# Patient Record
Sex: MALE | Race: BLACK OR AFRICAN AMERICAN | NOT HISPANIC OR LATINO | Employment: OTHER | URBAN - METROPOLITAN AREA
[De-identification: names, ages, dates, MRNs, and addresses within clinical notes are randomized per-mention and may not be internally consistent; named-entity substitution may affect disease eponyms.]

---

## 2023-05-30 ENCOUNTER — TELEPHONE (OUTPATIENT)
Dept: PRIMARY CARE | Facility: CLINIC | Age: 68
End: 2023-05-30
Payer: COMMERCIAL

## 2023-06-01 ENCOUNTER — OFFICE VISIT (OUTPATIENT)
Dept: PRIMARY CARE | Facility: CLINIC | Age: 68
End: 2023-06-01
Payer: COMMERCIAL

## 2023-06-01 VITALS
WEIGHT: 213.7 LBS | SYSTOLIC BLOOD PRESSURE: 120 MMHG | HEART RATE: 76 BPM | DIASTOLIC BLOOD PRESSURE: 71 MMHG | TEMPERATURE: 98.2 F | BODY MASS INDEX: 31.56 KG/M2 | OXYGEN SATURATION: 94 %

## 2023-06-01 DIAGNOSIS — I25.10 CORONARY ARTERY DISEASE INVOLVING NATIVE HEART WITHOUT ANGINA PECTORIS, UNSPECIFIED VESSEL OR LESION TYPE: ICD-10-CM

## 2023-06-01 DIAGNOSIS — E78.5 HYPERLIPIDEMIA, UNSPECIFIED HYPERLIPIDEMIA TYPE: ICD-10-CM

## 2023-06-01 DIAGNOSIS — M1A.9XX0 CHRONIC GOUT WITHOUT TOPHUS, UNSPECIFIED CAUSE, UNSPECIFIED SITE: ICD-10-CM

## 2023-06-01 DIAGNOSIS — E11.9 TYPE 2 DIABETES MELLITUS WITHOUT COMPLICATION, WITHOUT LONG-TERM CURRENT USE OF INSULIN (MULTI): ICD-10-CM

## 2023-06-01 DIAGNOSIS — E55.9 VITAMIN D DEFICIENCY: ICD-10-CM

## 2023-06-01 DIAGNOSIS — I10 HYPERTENSION, UNSPECIFIED TYPE: Primary | ICD-10-CM

## 2023-06-01 PROCEDURE — 1160F RVW MEDS BY RX/DR IN RCRD: CPT | Performed by: STUDENT IN AN ORGANIZED HEALTH CARE EDUCATION/TRAINING PROGRAM

## 2023-06-01 PROCEDURE — 1157F ADVNC CARE PLAN IN RCRD: CPT | Performed by: STUDENT IN AN ORGANIZED HEALTH CARE EDUCATION/TRAINING PROGRAM

## 2023-06-01 PROCEDURE — 1036F TOBACCO NON-USER: CPT | Performed by: STUDENT IN AN ORGANIZED HEALTH CARE EDUCATION/TRAINING PROGRAM

## 2023-06-01 PROCEDURE — 1159F MED LIST DOCD IN RCRD: CPT | Performed by: STUDENT IN AN ORGANIZED HEALTH CARE EDUCATION/TRAINING PROGRAM

## 2023-06-01 PROCEDURE — 4010F ACE/ARB THERAPY RXD/TAKEN: CPT | Performed by: STUDENT IN AN ORGANIZED HEALTH CARE EDUCATION/TRAINING PROGRAM

## 2023-06-01 PROCEDURE — 3078F DIAST BP <80 MM HG: CPT | Performed by: STUDENT IN AN ORGANIZED HEALTH CARE EDUCATION/TRAINING PROGRAM

## 2023-06-01 PROCEDURE — 99214 OFFICE O/P EST MOD 30 MIN: CPT | Performed by: STUDENT IN AN ORGANIZED HEALTH CARE EDUCATION/TRAINING PROGRAM

## 2023-06-01 PROCEDURE — 3074F SYST BP LT 130 MM HG: CPT | Performed by: STUDENT IN AN ORGANIZED HEALTH CARE EDUCATION/TRAINING PROGRAM

## 2023-06-01 RX ORDER — CHOLECALCIFEROL (VITAMIN D3) 25 MCG
25 TABLET ORAL DAILY
COMMUNITY
End: 2023-06-01 | Stop reason: SDUPTHER

## 2023-06-01 RX ORDER — HYDROCHLOROTHIAZIDE 25 MG/1
25 TABLET ORAL DAILY
Qty: 90 TABLET | Refills: 1 | Status: SHIPPED | OUTPATIENT
Start: 2023-06-01 | End: 2023-08-18 | Stop reason: SDUPTHER

## 2023-06-01 RX ORDER — HYDROCHLOROTHIAZIDE 12.5 MG/1
12.5 TABLET ORAL DAILY
COMMUNITY
Start: 2018-06-24 | End: 2023-06-01 | Stop reason: DRUGHIGH

## 2023-06-01 RX ORDER — ATORVASTATIN CALCIUM 40 MG/1
40 TABLET, FILM COATED ORAL DAILY
COMMUNITY
Start: 2022-04-18 | End: 2023-06-01 | Stop reason: SDUPTHER

## 2023-06-01 RX ORDER — LOSARTAN POTASSIUM 25 MG/1
25 TABLET ORAL DAILY
COMMUNITY
Start: 2021-10-25 | End: 2023-06-01 | Stop reason: SDUPTHER

## 2023-06-01 RX ORDER — LOSARTAN POTASSIUM 25 MG/1
25 TABLET ORAL DAILY
Qty: 90 TABLET | Refills: 1 | Status: SHIPPED | OUTPATIENT
Start: 2023-06-01 | End: 2023-08-18 | Stop reason: SDUPTHER

## 2023-06-01 RX ORDER — DULAGLUTIDE 1.5 MG/.5ML
1.5 INJECTION, SOLUTION SUBCUTANEOUS
COMMUNITY
Start: 2022-01-24 | End: 2023-06-01 | Stop reason: SDUPTHER

## 2023-06-01 RX ORDER — AMLODIPINE BESYLATE 10 MG/1
10 TABLET ORAL DAILY
COMMUNITY
Start: 2017-11-01 | End: 2023-06-01 | Stop reason: SDUPTHER

## 2023-06-01 RX ORDER — AMLODIPINE BESYLATE 10 MG/1
10 TABLET ORAL DAILY
Qty: 90 TABLET | Refills: 1 | Status: SHIPPED | OUTPATIENT
Start: 2023-06-01 | End: 2023-08-18 | Stop reason: SDUPTHER

## 2023-06-01 RX ORDER — METOPROLOL SUCCINATE 100 MG/1
100 TABLET, EXTENDED RELEASE ORAL DAILY
Qty: 90 TABLET | Refills: 1 | Status: SHIPPED | OUTPATIENT
Start: 2023-06-01 | End: 2023-08-18 | Stop reason: SDUPTHER

## 2023-06-01 RX ORDER — ATORVASTATIN CALCIUM 40 MG/1
40 TABLET, FILM COATED ORAL DAILY
Qty: 90 TABLET | Refills: 1 | Status: SHIPPED | OUTPATIENT
Start: 2023-06-01 | End: 2023-08-18 | Stop reason: SDUPTHER

## 2023-06-01 RX ORDER — ASPIRIN 81 MG/1
81 TABLET ORAL DAILY
COMMUNITY
Start: 2016-05-11 | End: 2023-06-01 | Stop reason: SDUPTHER

## 2023-06-01 RX ORDER — METOPROLOL SUCCINATE 100 MG/1
100 TABLET, EXTENDED RELEASE ORAL DAILY
COMMUNITY
Start: 2017-11-01 | End: 2023-06-01 | Stop reason: SDUPTHER

## 2023-06-01 RX ORDER — ASPIRIN 81 MG/1
81 TABLET ORAL DAILY
Qty: 90 TABLET | Refills: 1 | Status: SHIPPED | OUTPATIENT
Start: 2023-06-01 | End: 2023-08-18 | Stop reason: SDUPTHER

## 2023-06-01 RX ORDER — METFORMIN HYDROCHLORIDE 1000 MG/1
1000 TABLET ORAL EVERY 12 HOURS
Qty: 180 TABLET | Refills: 1 | Status: SHIPPED | OUTPATIENT
Start: 2023-06-01 | End: 2024-05-21 | Stop reason: SDUPTHER

## 2023-06-01 RX ORDER — METFORMIN HYDROCHLORIDE 1000 MG/1
1000 TABLET ORAL EVERY 12 HOURS
COMMUNITY
Start: 2019-03-25 | End: 2023-06-01 | Stop reason: SDUPTHER

## 2023-06-01 RX ORDER — CHOLECALCIFEROL (VITAMIN D3) 25 MCG
25 TABLET ORAL DAILY
Qty: 90 TABLET | Refills: 1 | Status: SHIPPED | OUTPATIENT
Start: 2023-06-01 | End: 2023-12-07 | Stop reason: SDUPTHER

## 2023-06-01 RX ORDER — DULAGLUTIDE 1.5 MG/.5ML
1.5 INJECTION, SOLUTION SUBCUTANEOUS
Qty: 2 ML | Refills: 2 | Status: SHIPPED | OUTPATIENT
Start: 2023-06-01 | End: 2023-12-19

## 2023-06-01 ASSESSMENT — PAIN SCALES - GENERAL: PAINLEVEL: 0-NO PAIN

## 2023-06-01 NOTE — PROGRESS NOTES
Subjective   Patient ID: Karri Ryan is a 67 y.o. male with a PMHx of HTN, HFpEF, CAD, T2DM, and Chronic Gout who presents for Med Management. Patient last office visit was about a year ago with Dr. Nesbitt.    HPI:     #HTN  #HFpEF  #CAD  - IO /71  - Current regimen: Amlodipine 10 mg QD, Losartan 25 mg QD, HCTZ 25 mg QD, Metoprolol Succinate 100 mg every day, aspirin 81mg every day  - Asymptomatic  - Reports compliance with his medications and reports no complaints or symptoms.   - Denies any headaches, vision changes, chest pain, palpitations, SOB, peripheral edema     #T2DM  - AM B   - Current Medications: Metformin 1000mg BID, Trulicity, Lantus (no longer taking)  - Follows with Endocrinology for care  - Follows with podiatry (last year); no foot issues  - Had not seen an eye doctor for a long time. Denies any vision issues at this time. But would like an ophthalmology referral for annual exam.   - 10/2022 Hgb A1c 6.0%    #Chronic Gout  - No flare for 1 year  - Not on any medications  - Had gout flare about a year ago at which he was started on allopurinol and referred to podiatry. Uric acid level at the time was 12. 1  - Asymptomatic currently     Review of Systems   All other systems reviewed and are negative.  Unless mentioned in HPI; 10 systems reviewed     Objective   Physical Exam  Vitals reviewed.   Constitutional:       General: He is not in acute distress.     Appearance: Normal appearance. He is not ill-appearing.   HENT:      Head: Normocephalic and atraumatic.      Right Ear: External ear normal.      Left Ear: External ear normal.      Nose: Nose normal.      Mouth/Throat:      Mouth: Mucous membranes are moist.   Eyes:      Extraocular Movements: Extraocular movements intact.      Conjunctiva/sclera: Conjunctivae normal.   Cardiovascular:      Rate and Rhythm: Normal rate and regular rhythm.      Pulses: Normal pulses.      Heart sounds: Normal heart sounds. No murmur heard.     No  friction rub. No gallop.   Pulmonary:      Effort: Pulmonary effort is normal. No respiratory distress.      Breath sounds: Normal breath sounds. No wheezing, rhonchi or rales.   Abdominal:      General: Bowel sounds are normal. There is no distension.      Palpations: Abdomen is soft. There is no mass.      Tenderness: There is no abdominal tenderness. There is no guarding or rebound.   Musculoskeletal:         General: No swelling, tenderness or deformity. Normal range of motion.      Cervical back: Normal range of motion and neck supple.      Right lower leg: No edema.      Left lower leg: No edema.   Skin:     General: Skin is warm and dry.      Capillary Refill: Capillary refill takes less than 2 seconds.   Neurological:      General: No focal deficit present.      Mental Status: He is alert and oriented to person, place, and time. Mental status is at baseline.   Psychiatric:         Mood and Affect: Mood normal.         Behavior: Behavior normal.         Thought Content: Thought content normal.         Judgment: Judgment normal.       /71 (BP Location: Right arm, Patient Position: Sitting, BP Cuff Size: Adult)   Pulse 76   Temp 36.8 °C (98.2 °F) (Temporal)   Wt 96.9 kg (213 lb 11.2 oz)   SpO2 94%   BMI 31.56 kg/m²    Lab Results   Component Value Date    WBC 7.2 10/24/2021    HGB 11.3 (L) 10/24/2021    HCT 35.2 (L) 10/24/2021    MCV 87 10/24/2021     10/24/2021      Lab Results   Component Value Date    GLUCOSE 90 10/20/2022    CALCIUM 9.8 10/20/2022     10/20/2022    K 4.9 10/20/2022    CO2 28 10/20/2022     (H) 10/20/2022    BUN 20 10/20/2022    CREATININE 1.56 (H) 10/20/2022        Assessment/Plan      Mr. Ryan is a 68 yo M who came to the clinic presenting for follow up and medications refill. Overall clinically stable and is doing well. Plan as below:    #HTN  #HFpEF  #CAD  #HLD  - Stable; at goal. Asymptomatic   - C/w Current regimen: Amlodipine 10 mg QD, Losartan 25 mg  QD, HCT 25 mg QD, Metoprolol Succinate 100 mg every day, aspirin 81mg every day, atorvastatin 40mg every day; all refilled  - Ordered CMP, Lipid panel  - Encourage DASH diet and exercise     #T2DM  - Stable; at goal; asymptomatic   - C/w metformin 1000mg BID and Trulicity 1.5mg qweekly; refilled  - Ordered repeat Hgb A1c, urine albumin  - Encourage healthy diet and exercise   - Ordered Ophthalmology referral   - Return/ED precautions discussed     #Gout  - Stable while not on medications  - hold off on allopurinol at this time  - Ordered repeat uric acid level    #HM  - Medications list reviewed and reconciled; refilled as appropriate     RTC in 3 months for follow up/ HM visit     Seen and discussed with Dr. Chas Dillon DO   PGY-3 Family Medicine

## 2023-06-05 NOTE — PROGRESS NOTES
I saw and evaluated the patient. I personally obtained the key and critical portions of the history and physical exam or was physically present for key and critical portions performed by the resident/fellow. I reviewed the resident/fellow's documentation and discussed the patient with the resident/fellow. I agree with the resident/fellow's medical decision making as documented in the note.    Madhu Doherty MD

## 2023-07-13 ENCOUNTER — LAB (OUTPATIENT)
Dept: LAB | Facility: LAB | Age: 68
End: 2023-07-13
Payer: COMMERCIAL

## 2023-07-13 DIAGNOSIS — I10 HYPERTENSION, UNSPECIFIED TYPE: ICD-10-CM

## 2023-07-13 DIAGNOSIS — E11.9 TYPE 2 DIABETES MELLITUS WITHOUT COMPLICATION, WITHOUT LONG-TERM CURRENT USE OF INSULIN (MULTI): ICD-10-CM

## 2023-07-13 DIAGNOSIS — I25.10 CORONARY ARTERY DISEASE INVOLVING NATIVE HEART WITHOUT ANGINA PECTORIS, UNSPECIFIED VESSEL OR LESION TYPE: ICD-10-CM

## 2023-07-13 DIAGNOSIS — E78.5 HYPERLIPIDEMIA, UNSPECIFIED HYPERLIPIDEMIA TYPE: ICD-10-CM

## 2023-07-13 DIAGNOSIS — M1A.9XX0 CHRONIC GOUT WITHOUT TOPHUS, UNSPECIFIED CAUSE, UNSPECIFIED SITE: ICD-10-CM

## 2023-07-13 LAB
ALANINE AMINOTRANSFERASE (SGPT) (U/L) IN SER/PLAS: 26 U/L (ref 10–52)
ALBUMIN (G/DL) IN SER/PLAS: 4.4 G/DL (ref 3.4–5)
ALBUMIN (MG/L) IN URINE: 16.4 MG/L
ALBUMIN/CREATININE (UG/MG) IN URINE: 7.6 UG/MG CRT (ref 0–30)
ALKALINE PHOSPHATASE (U/L) IN SER/PLAS: 78 U/L (ref 33–136)
ANION GAP IN SER/PLAS: 14 MMOL/L (ref 10–20)
ASPARTATE AMINOTRANSFERASE (SGOT) (U/L) IN SER/PLAS: 17 U/L (ref 9–39)
BILIRUBIN TOTAL (MG/DL) IN SER/PLAS: 0.4 MG/DL (ref 0–1.2)
CALCIUM (MG/DL) IN SER/PLAS: 9.7 MG/DL (ref 8.6–10.6)
CARBON DIOXIDE, TOTAL (MMOL/L) IN SER/PLAS: 23 MMOL/L (ref 21–32)
CHLORIDE (MMOL/L) IN SER/PLAS: 108 MMOL/L (ref 98–107)
CHOLESTEROL (MG/DL) IN SER/PLAS: 121 MG/DL (ref 0–199)
CHOLESTEROL IN HDL (MG/DL) IN SER/PLAS: 36.7 MG/DL
CHOLESTEROL/HDL RATIO: 3.3
CREATININE (MG/DL) IN SER/PLAS: 1.62 MG/DL (ref 0.5–1.3)
CREATININE (MG/DL) IN URINE: 216 MG/DL (ref 20–370)
GFR MALE: 45 ML/MIN/1.73M2
GLUCOSE (MG/DL) IN SER/PLAS: 88 MG/DL (ref 74–99)
LDL: 60 MG/DL (ref 0–99)
POTASSIUM (MMOL/L) IN SER/PLAS: 4.8 MMOL/L (ref 3.5–5.3)
PROTEIN TOTAL: 8 G/DL (ref 6.4–8.2)
SODIUM (MMOL/L) IN SER/PLAS: 140 MMOL/L (ref 136–145)
TRIGLYCERIDE (MG/DL) IN SER/PLAS: 124 MG/DL (ref 0–149)
URATE (MG/DL) IN SER/PLAS: 7.7 MG/DL (ref 4–7.5)
UREA NITROGEN (MG/DL) IN SER/PLAS: 20 MG/DL (ref 6–23)
VLDL: 25 MG/DL (ref 0–40)

## 2023-07-13 PROCEDURE — 82043 UR ALBUMIN QUANTITATIVE: CPT

## 2023-07-13 PROCEDURE — 83036 HEMOGLOBIN GLYCOSYLATED A1C: CPT

## 2023-07-13 PROCEDURE — 82570 ASSAY OF URINE CREATININE: CPT

## 2023-07-13 PROCEDURE — 84550 ASSAY OF BLOOD/URIC ACID: CPT

## 2023-07-13 PROCEDURE — 80053 COMPREHEN METABOLIC PANEL: CPT

## 2023-07-13 PROCEDURE — 80061 LIPID PANEL: CPT

## 2023-07-13 PROCEDURE — 36415 COLL VENOUS BLD VENIPUNCTURE: CPT

## 2023-07-14 LAB
ESTIMATED AVERAGE GLUCOSE FOR HBA1C: 126 MG/DL
HEMOGLOBIN A1C/HEMOGLOBIN TOTAL IN BLOOD: 6 %

## 2023-08-03 ENCOUNTER — APPOINTMENT (OUTPATIENT)
Dept: PRIMARY CARE | Facility: CLINIC | Age: 68
End: 2023-08-03
Payer: COMMERCIAL

## 2023-08-18 ENCOUNTER — OFFICE VISIT (OUTPATIENT)
Dept: PRIMARY CARE | Facility: CLINIC | Age: 68
End: 2023-08-18
Payer: COMMERCIAL

## 2023-08-18 VITALS
HEART RATE: 83 BPM | BODY MASS INDEX: 31.1 KG/M2 | HEIGHT: 69 IN | SYSTOLIC BLOOD PRESSURE: 106 MMHG | WEIGHT: 210 LBS | TEMPERATURE: 97.6 F | DIASTOLIC BLOOD PRESSURE: 61 MMHG | OXYGEN SATURATION: 98 %

## 2023-08-18 DIAGNOSIS — E78.5 HYPERLIPIDEMIA, UNSPECIFIED HYPERLIPIDEMIA TYPE: ICD-10-CM

## 2023-08-18 DIAGNOSIS — E11.9 TYPE 2 DIABETES MELLITUS WITHOUT COMPLICATION, WITHOUT LONG-TERM CURRENT USE OF INSULIN (MULTI): ICD-10-CM

## 2023-08-18 DIAGNOSIS — I10 HYPERTENSION, UNSPECIFIED TYPE: Primary | ICD-10-CM

## 2023-08-18 DIAGNOSIS — I25.10 CORONARY ARTERY DISEASE INVOLVING NATIVE HEART WITHOUT ANGINA PECTORIS, UNSPECIFIED VESSEL OR LESION TYPE: ICD-10-CM

## 2023-08-18 PROBLEM — F17.200 NICOTINE DEPENDENCE: Status: ACTIVE | Noted: 2023-08-18

## 2023-08-18 PROBLEM — Z98.61 CAD S/P PERCUTANEOUS CORONARY ANGIOPLASTY: Status: ACTIVE | Noted: 2023-08-18

## 2023-08-18 PROBLEM — I21.4 NSTEMI, INITIAL EPISODE OF CARE (MULTI): Status: ACTIVE | Noted: 2023-08-18

## 2023-08-18 PROBLEM — K21.9 GERD (GASTROESOPHAGEAL REFLUX DISEASE): Status: ACTIVE | Noted: 2023-08-18

## 2023-08-18 PROBLEM — Z86.2 HISTORY OF ANEMIA: Status: ACTIVE | Noted: 2023-08-18

## 2023-08-18 PROBLEM — E87.5 HYPERKALEMIA: Status: ACTIVE | Noted: 2023-08-18

## 2023-08-18 PROBLEM — B96.89 ACUTE BACTERIAL BRONCHITIS: Status: ACTIVE | Noted: 2023-08-18

## 2023-08-18 PROBLEM — M19.079: Status: ACTIVE | Noted: 2023-08-18

## 2023-08-18 PROBLEM — R06.83 SNORING: Status: ACTIVE | Noted: 2023-08-18

## 2023-08-18 PROBLEM — G47.30 SLEEP APNEA: Status: ACTIVE | Noted: 2023-08-18

## 2023-08-18 PROBLEM — H31.099 PERIPHERAL SCARS OF RETINA: Status: ACTIVE | Noted: 2023-08-18

## 2023-08-18 PROBLEM — M25.572 TOE JOINT PAIN, LEFT: Status: ACTIVE | Noted: 2023-08-18

## 2023-08-18 PROBLEM — K59.00 CONSTIPATION: Status: ACTIVE | Noted: 2023-08-18

## 2023-08-18 PROBLEM — R05.8 POST-TUSSIVE SYNCOPE: Status: ACTIVE | Noted: 2023-08-18

## 2023-08-18 PROBLEM — M79.10 MYALGIA: Status: ACTIVE | Noted: 2023-08-18

## 2023-08-18 PROBLEM — H52.4 PRESBYOPIA: Status: ACTIVE | Noted: 2023-08-18

## 2023-08-18 PROBLEM — E66.812 OBESITY, CLASS II, BMI 35-39.9: Status: ACTIVE | Noted: 2023-08-18

## 2023-08-18 PROBLEM — J20.8 ACUTE BACTERIAL BRONCHITIS: Status: ACTIVE | Noted: 2023-08-18

## 2023-08-18 PROBLEM — H52.13 MYOPIA OF BOTH EYES WITH REGULAR ASTIGMATISM: Status: ACTIVE | Noted: 2023-08-18

## 2023-08-18 PROBLEM — N18.30 STAGE 3 CHRONIC KIDNEY DISEASE (MULTI): Status: ACTIVE | Noted: 2023-08-18

## 2023-08-18 PROBLEM — M10.9 GOUT: Status: ACTIVE | Noted: 2023-08-18

## 2023-08-18 PROBLEM — J06.9 VIRAL URI WITH COUGH: Status: ACTIVE | Noted: 2023-08-18

## 2023-08-18 PROBLEM — E66.9 OBESITY, CLASS II, BMI 35-39.9: Status: ACTIVE | Noted: 2023-08-18

## 2023-08-18 PROBLEM — G47.10 HYPERSOMNIA: Status: ACTIVE | Noted: 2023-08-18

## 2023-08-18 PROBLEM — R31.29 OTHER MICROSCOPIC HEMATURIA: Status: ACTIVE | Noted: 2023-08-18

## 2023-08-18 PROBLEM — E16.1 NOCTURNAL HYPOGLYCEMIA: Status: ACTIVE | Noted: 2023-08-18

## 2023-08-18 PROBLEM — R31.9 HEMATURIA: Status: ACTIVE | Noted: 2023-08-18

## 2023-08-18 PROBLEM — H52.223 MYOPIA OF BOTH EYES WITH REGULAR ASTIGMATISM: Status: ACTIVE | Noted: 2023-08-18

## 2023-08-18 PROBLEM — H52.03 HYPERMETROPIA OF BOTH EYES: Status: ACTIVE | Noted: 2023-08-18

## 2023-08-18 PROBLEM — B35.3 TINEA PEDIS OF BOTH FEET: Status: ACTIVE | Noted: 2023-08-18

## 2023-08-18 PROBLEM — N17.9 AKI (ACUTE KIDNEY INJURY) (CMS-HCC): Status: ACTIVE | Noted: 2023-08-18

## 2023-08-18 PROBLEM — E55.9 VITAMIN D DEFICIENCY: Status: ACTIVE | Noted: 2023-08-18

## 2023-08-18 PROCEDURE — 3074F SYST BP LT 130 MM HG: CPT

## 2023-08-18 PROCEDURE — 99213 OFFICE O/P EST LOW 20 MIN: CPT

## 2023-08-18 PROCEDURE — 1157F ADVNC CARE PLAN IN RCRD: CPT

## 2023-08-18 PROCEDURE — 4010F ACE/ARB THERAPY RXD/TAKEN: CPT

## 2023-08-18 PROCEDURE — 1160F RVW MEDS BY RX/DR IN RCRD: CPT

## 2023-08-18 PROCEDURE — 3044F HG A1C LEVEL LT 7.0%: CPT

## 2023-08-18 PROCEDURE — 3078F DIAST BP <80 MM HG: CPT

## 2023-08-18 PROCEDURE — 1126F AMNT PAIN NOTED NONE PRSNT: CPT

## 2023-08-18 PROCEDURE — 1159F MED LIST DOCD IN RCRD: CPT

## 2023-08-18 RX ORDER — ASPIRIN 81 MG/1
81 TABLET ORAL DAILY
Qty: 90 TABLET | Refills: 1 | Status: SHIPPED | OUTPATIENT
Start: 2023-08-18 | End: 2024-05-21 | Stop reason: SDUPTHER

## 2023-08-18 RX ORDER — LOSARTAN POTASSIUM 25 MG/1
25 TABLET ORAL DAILY
Qty: 90 TABLET | Refills: 1 | Status: SHIPPED | OUTPATIENT
Start: 2023-08-18 | End: 2024-05-21 | Stop reason: SDUPTHER

## 2023-08-18 RX ORDER — HYDROCHLOROTHIAZIDE 25 MG/1
25 TABLET ORAL DAILY
Qty: 90 TABLET | Refills: 1 | Status: SHIPPED | OUTPATIENT
Start: 2023-08-18 | End: 2024-05-21 | Stop reason: SDUPTHER

## 2023-08-18 RX ORDER — METOPROLOL SUCCINATE 100 MG/1
100 TABLET, EXTENDED RELEASE ORAL DAILY
Qty: 90 TABLET | Refills: 1 | Status: SHIPPED | OUTPATIENT
Start: 2023-08-18 | End: 2024-05-21 | Stop reason: SDUPTHER

## 2023-08-18 RX ORDER — AMLODIPINE BESYLATE 10 MG/1
10 TABLET ORAL DAILY
Qty: 90 TABLET | Refills: 1 | Status: SHIPPED | OUTPATIENT
Start: 2023-08-18 | End: 2024-05-21 | Stop reason: SDUPTHER

## 2023-08-18 RX ORDER — ATORVASTATIN CALCIUM 40 MG/1
40 TABLET, FILM COATED ORAL DAILY
Qty: 90 TABLET | Refills: 1 | Status: SHIPPED | OUTPATIENT
Start: 2023-08-18 | End: 2024-05-21 | Stop reason: SDUPTHER

## 2023-08-18 ASSESSMENT — ENCOUNTER SYMPTOMS
OCCASIONAL FEELINGS OF UNSTEADINESS: 0
LOSS OF SENSATION IN FEET: 0
DEPRESSION: 0

## 2023-08-18 ASSESSMENT — PAIN SCALES - GENERAL: PAINLEVEL: 0-NO PAIN

## 2023-08-18 NOTE — PROGRESS NOTES
"Subjective   Patient ID: Karri Ryan is a 67 y.o. male with HTN, CAD, HLD, DM2 who presents for Follow-up.    Last seen in the clinic in June 2023     HPI     #HTN  #HFpEF  #CAD (NSTEMI)  - IO /61   - Does not check BP at home   - Current regimen: Amlodipine 10 mg QD, Losartan 25 mg QD, HCTZ 25 mg QD, Metoprolol Succinate 100 mg every day, aspirin 81mg every day  - Reports compliance with his medications and reports no complaints or symptoms.   - Denies any headaches, vision changes, chest pain, palpitations, SOB, peripheral edema     #T2DM  - Has not been checking BG at home regularly   - Current Medications: Metformin 1000mg BID, Trulicity 1.5mg every Thursday   - Follows with Endocrinology for care (missed his last apt in July)   - Follows with podiatry (last year); no foot issues  - Due for eye exam ( 10/20/23)  - 7/13/23 Hgb A1c 6.0%    #Chronic Gout  - No flare for 1 year  - Used to take allopurinol but not anymore   - Last uric acid level in July 2023 was 7.7 (from 12.3 a year ago)   - Asymptomatic currently    Review of Systems  12pt ROS negative except as mentioned in HPI     Objective   /61 (BP Location: Right arm, Patient Position: Sitting, BP Cuff Size: Adult)   Pulse 83   Temp 36.4 °C (97.6 °F) (Temporal)   Ht 1.753 m (5' 9\")   Wt 95.3 kg (210 lb)   SpO2 98%   BMI 31.01 kg/m²     Physical Exam  Constitutional:       Appearance: Normal appearance.   Cardiovascular:      Rate and Rhythm: Normal rate and regular rhythm.      Pulses: Normal pulses.      Heart sounds: Normal heart sounds.   Pulmonary:      Effort: Pulmonary effort is normal.      Breath sounds: Normal breath sounds.   Abdominal:      General: Abdomen is flat. Bowel sounds are normal.      Palpations: Abdomen is soft.   Musculoskeletal:         General: Normal range of motion.   Skin:     General: Skin is warm.      Capillary Refill: Capillary refill takes less than 2 seconds.   Neurological:      Mental Status: He is " alert and oriented to person, place, and time.   Psychiatric:         Mood and Affect: Mood normal.         Behavior: Behavior normal.       Lab Results   Component Value Date    CREATININE 1.62 (H) 07/13/2023    BUN 20 07/13/2023     07/13/2023    K 4.8 07/13/2023     (H) 07/13/2023    CO2 23 07/13/2023     Lab Results   Component Value Date    HGBA1C 6.0 (A) 07/13/2023     Normal urine microalbumin in July 2023         Assessment/Plan          67 y.o. male with HTN, CAD, HLD, DM2 who presents for Follow-up. HD stable. No acute medical concern today.     #HTN  #CKD (GFR 45)   -No acute concern   - Monitor BP at home   - Hold HCTZ if BP <90/60   - BP meds refill sent     #DM2  - well controlled   - c/w current regimen   - Reschedule endocrinologist apt     RTC in 3 months for HMV     Discussed with Dr Wong Gupta MD  Family Medicine PGY2

## 2023-08-25 NOTE — PROGRESS NOTES
I reviewed with the resident the medical history and the resident’s findings on physical examination.  I discussed with the resident the patient’s diagnosis and concur with the treatment plan as documented in the resident note.     Kathrin Back MD

## 2023-09-20 RX ORDER — INSULIN PUMP SYRINGE, 3 ML
EACH MISCELLANEOUS
COMMUNITY
End: 2024-01-22 | Stop reason: ALTCHOICE

## 2023-09-20 RX ORDER — ALLOPURINOL 100 MG/1
100 TABLET ORAL DAILY
COMMUNITY
End: 2023-12-07 | Stop reason: SDUPTHER

## 2023-09-20 RX ORDER — IBUPROFEN 200 MG
1 TABLET ORAL EVERY 24 HOURS
COMMUNITY
End: 2024-01-22 | Stop reason: ALTCHOICE

## 2023-09-20 RX ORDER — IBUPROFEN 200 MG
TABLET ORAL AS NEEDED
COMMUNITY

## 2023-09-20 RX ORDER — NICOTINE 7MG/24HR
1 PATCH, TRANSDERMAL 24 HOURS TRANSDERMAL EVERY 24 HOURS
COMMUNITY
End: 2024-01-22 | Stop reason: ALTCHOICE

## 2023-09-20 RX ORDER — INSULIN GLARGINE 100 [IU]/ML
INJECTION, SOLUTION SUBCUTANEOUS EVERY 24 HOURS
COMMUNITY
End: 2024-01-22 | Stop reason: WASHOUT

## 2023-10-20 ENCOUNTER — OFFICE VISIT (OUTPATIENT)
Dept: OPHTHALMOLOGY | Facility: CLINIC | Age: 68
End: 2023-10-20
Payer: COMMERCIAL

## 2023-10-20 DIAGNOSIS — E11.9 CONTROLLED TYPE 2 DIABETES MELLITUS WITHOUT COMPLICATION, UNSPECIFIED WHETHER LONG TERM INSULIN USE (MULTI): Primary | ICD-10-CM

## 2023-10-20 DIAGNOSIS — H52.03 HYPEROPIA OF BOTH EYES: ICD-10-CM

## 2023-10-20 PROCEDURE — 92002 INTRM OPH EXAM NEW PATIENT: CPT | Performed by: OPHTHALMOLOGY

## 2023-10-20 PROCEDURE — 92015 DETERMINE REFRACTIVE STATE: CPT | Performed by: OPHTHALMOLOGY

## 2023-10-20 RX ORDER — DICLOFENAC SODIUM 10 MG/G
GEL TOPICAL
COMMUNITY
Start: 2023-10-09

## 2023-10-20 RX ORDER — IBUPROFEN 600 MG/1
TABLET ORAL
COMMUNITY
Start: 2023-09-20 | End: 2023-12-07 | Stop reason: WASHOUT

## 2023-10-20 RX ORDER — KETOCONAZOLE 20 MG/G
CREAM TOPICAL
COMMUNITY
Start: 2023-09-26

## 2023-10-20 ASSESSMENT — VISUAL ACUITY
OS_PH_SC: 20/25-2
METHOD: SNELLEN - LINEAR
OD_PH_SC: 20/20-2
OS_SC: 20/50+2
OD_SC: 20/40

## 2023-10-20 ASSESSMENT — REFRACTION_MANIFEST
OS_CYLINDER: -1.50
OD_SPHERE: +1.75
OS_ADD: +2.50
OS_AXIS: 070
OD_AXIS: 100
METHOD_AUTOREFRACTION: 1
OD_AXIS: 100
OD_CYLINDER: -1.25
OD_ADD: +2.50
OD_SPHERE: +1.75
OS_SPHERE: +2.00
OS_CYLINDER: -1.50
OS_SPHERE: +2.00
OD_ADD: +2.50
OS_AXIS: 050
OD_CYLINDER: -1.25

## 2023-10-20 ASSESSMENT — TONOMETRY
OS_IOP_MMHG: 12
OD_IOP_MMHG: 12
IOP_METHOD: GOLDMANN APPLANATION

## 2023-10-20 ASSESSMENT — SLIT LAMP EXAM - LIDS
COMMENTS: NORMAL
COMMENTS: NORMAL

## 2023-10-20 ASSESSMENT — EXTERNAL EXAM - RIGHT EYE: OD_EXAM: NORMAL

## 2023-10-20 ASSESSMENT — EXTERNAL EXAM - LEFT EYE: OS_EXAM: NORMAL

## 2023-10-20 NOTE — PROGRESS NOTES
Assessment/Plan   Diagnoses and all orders for this visit:  Controlled type 2 diabetes mellitus without complication, unspecified whether long term insulin use (CMS/[facility])  Hyperopia of both eyes  no retinopathy observed on exam today od/os, pt ed to continue good BGlc, blood pressure and lipid control, rtc with any changes in vision, otherwise monitor 1 year   Hyperopia  Glasses prescription given to patient today

## 2023-12-07 ENCOUNTER — OFFICE VISIT (OUTPATIENT)
Dept: PRIMARY CARE | Facility: CLINIC | Age: 68
End: 2023-12-07
Payer: COMMERCIAL

## 2023-12-07 VITALS
DIASTOLIC BLOOD PRESSURE: 68 MMHG | WEIGHT: 218.9 LBS | HEART RATE: 71 BPM | OXYGEN SATURATION: 97 % | TEMPERATURE: 96.8 F | SYSTOLIC BLOOD PRESSURE: 114 MMHG | HEIGHT: 69 IN | BODY MASS INDEX: 32.42 KG/M2

## 2023-12-07 DIAGNOSIS — F17.210 CIGARETTE NICOTINE DEPENDENCE WITHOUT COMPLICATION: ICD-10-CM

## 2023-12-07 DIAGNOSIS — Z00.00 ROUTINE GENERAL MEDICAL EXAMINATION AT A HEALTH CARE FACILITY: Primary | ICD-10-CM

## 2023-12-07 DIAGNOSIS — M1A.9XX0 CHRONIC GOUT WITHOUT TOPHUS, UNSPECIFIED CAUSE, UNSPECIFIED SITE: ICD-10-CM

## 2023-12-07 DIAGNOSIS — M19.079 ARTHRITIS OF GREATER TOE AT METATARSOPHALANGEAL JOINT: ICD-10-CM

## 2023-12-07 DIAGNOSIS — E55.9 VITAMIN D DEFICIENCY: ICD-10-CM

## 2023-12-07 PROCEDURE — 3074F SYST BP LT 130 MM HG: CPT

## 2023-12-07 PROCEDURE — 1159F MED LIST DOCD IN RCRD: CPT

## 2023-12-07 PROCEDURE — 4010F ACE/ARB THERAPY RXD/TAKEN: CPT

## 2023-12-07 PROCEDURE — 3078F DIAST BP <80 MM HG: CPT

## 2023-12-07 PROCEDURE — 1160F RVW MEDS BY RX/DR IN RCRD: CPT

## 2023-12-07 PROCEDURE — 1126F AMNT PAIN NOTED NONE PRSNT: CPT

## 2023-12-07 PROCEDURE — 99397 PER PM REEVAL EST PAT 65+ YR: CPT

## 2023-12-07 PROCEDURE — 3044F HG A1C LEVEL LT 7.0%: CPT

## 2023-12-07 PROCEDURE — 99213 OFFICE O/P EST LOW 20 MIN: CPT

## 2023-12-07 RX ORDER — CHOLECALCIFEROL (VITAMIN D3) 25 MCG
2000 TABLET ORAL DAILY
Qty: 90 TABLET | Refills: 0 | Status: SHIPPED | OUTPATIENT
Start: 2023-12-07 | End: 2024-05-21 | Stop reason: SDUPTHER

## 2023-12-07 RX ORDER — IBUPROFEN 200 MG
1 TABLET ORAL EVERY 24 HOURS
Qty: 14 PATCH | Refills: 0 | Status: SHIPPED | OUTPATIENT
Start: 2023-12-07 | End: 2024-01-22 | Stop reason: ALTCHOICE

## 2023-12-07 RX ORDER — NICOTINE 7MG/24HR
1 PATCH, TRANSDERMAL 24 HOURS TRANSDERMAL EVERY 24 HOURS
Qty: 14 PATCH | Refills: 0 | Status: SHIPPED | OUTPATIENT
Start: 2023-12-07 | End: 2024-01-22 | Stop reason: ALTCHOICE

## 2023-12-07 RX ORDER — ACETAMINOPHEN 500 MG
TABLET ORAL
Qty: 180 TABLET | Refills: 3 | Status: SHIPPED | OUTPATIENT
Start: 2023-12-07 | End: 2024-01-22 | Stop reason: ALTCHOICE

## 2023-12-07 RX ORDER — ALLOPURINOL 100 MG/1
100 TABLET ORAL DAILY
Qty: 90 TABLET | Refills: 3 | Status: SHIPPED | OUTPATIENT
Start: 2023-12-07 | End: 2024-05-21 | Stop reason: SDUPTHER

## 2023-12-07 RX ORDER — IBUPROFEN 200 MG
1 TABLET ORAL EVERY 24 HOURS
Qty: 42 PATCH | Refills: 0 | Status: SHIPPED | OUTPATIENT
Start: 2023-12-07 | End: 2024-01-22 | Stop reason: ALTCHOICE

## 2023-12-07 ASSESSMENT — ENCOUNTER SYMPTOMS
OCCASIONAL FEELINGS OF UNSTEADINESS: 0
DEPRESSION: 0
LOSS OF SENSATION IN FEET: 0

## 2023-12-07 ASSESSMENT — PAIN SCALES - GENERAL: PAINLEVEL: 0-NO PAIN

## 2023-12-07 NOTE — PROGRESS NOTES
"Subjective   Patient ID: Karri Ryan is a 68 y.o. male with PMH of HTN, CAD, HLD, DM2, who presents for Follow-up (Refills ) and health maintenance.    # Health Maintenance  - Last prior HM: unknown  - Patient's rating of their own health: Fair, some days feels better than others.  - Dental Care: last prior dental visit 3-4y ago //  - Vision: last prior ophtho visit 10/20/2023 // corrective devices: lens, unchanged // recent vision: unchanged  - Hearing: denies recent hearing loss unchanged  - Diet: eats chicken, steak, porkchops, mac and cheese. \"Eating the wrong food and I know it, makes gout worse\"  - Exercise: walking   - Weight: increasing, increased by 8 pounds since last visit  - Smoking: current smoker, 3 packs per week. Would like to start patches  - EtOH: Alcohol Use: Yes, patient drinks alcohol. Every now and then - less than one drink per week  - Illicit substances: denied.  - Employment: works side job - clean up  - Living Situation: lives by himself  - Colon CA: last prior screening {emscolonca:74768} in *** // family h/o colon ca? {YES-DESCRIBE/NO:64322}    Refills:  - motrin, vitamin d, allopurinol    PHQ2:  Negative. Considers self to be happy    Food insecurity:      Review of Systems  Constitutional:  Negative for chills and fever.   HENT:  Negative for congestion.    Eyes:  Negative for visual disturbance.   Respiratory:  Negative for cough and shortness of breath.    Cardiovascular:  Negative for chest pain.   Gastrointestinal:  Negative for abdominal pain, diarrhea and nausea.   Genitourinary: Negative.    Musculoskeletal:  Negative.  Neurological:  Negative for dizziness and headaches.   Psychiatric/Behavioral: No changes in mood or behavior       Objective   /68 (BP Location: Right arm, Patient Position: Sitting, BP Cuff Size: Adult long)   Pulse 71   Temp 36 °C (96.8 °F) (Skin)   Ht 1.753 m (5' 9\")   Wt 99.3 kg (218 lb 14.4 oz)   SpO2 97%   BMI 32.33 kg/m²  There is no height " or weight on file to calculate BMI.    Physical Exam  Constitutional:       General: He is not in acute distress.  Eyes:      Extraocular Movements: Extraocular movements intact.      Conjunctiva/sclera: Conjunctivae normal.   Cardiovascular:      Rate and Rhythm: Normal rate and regular rhythm.      Heart sounds: Normal heart sounds. No murmur heard.  Pulmonary:      Effort: Pulmonary effort is normal.      Breath sounds: Normal breath sounds.   Abdominal:      General: There is no distension.      Palpations: Abdomen is soft.   Musculoskeletal:         General: Normal range of motion.   Neurological:      General: No focal deficit present.      Mental Status: He is alert and oriented to person, place, and time.      Comments: CN 2-12 grossly intact   Psychiatric:         Mood and Affect: Mood normal.         Behavior: Behavior normal.       Assessment/Plan       Raymond Ureña, MS3  Medical Student

## 2023-12-11 NOTE — PROGRESS NOTES
"Subjective   Patient ID: Karri Ryan is a 68 y.o. male with PMH of CAD, htn, CKD, HLD, and T2DM who presents for Follow-up (Refills ) and HM.     HPI  # Health Maintenance  - Last prior HM: unknown  - Patient's rating of their own health: Fair, some days feels better than others.  - Dental Care: last prior dental visit 3-4y ago //  - Vision: last prior ophtho visit 10/20/2023 // corrective devices: lens, unchanged // recent vision: unchanged  - Hearing: denies recent hearing loss unchanged  - Diet: eats chicken, steak, porkchops, mac and cheese. \"Eating the wrong food and I know it, makes gout worse\"  - Exercise: walking   - Weight: increasing, increased by 8 pounds since last visit  - Smoking: current smoker, 3 packs per week. Would like to start patches  - EtOH: Alcohol Use: Yes, patient drinks alcohol. Every now and then - less than one drink per week  - Illicit substances: denied.  - Employment: works side job - clean up  - Living Situation: lives by himself  - Colon CA: last prior screening unsure     #Tobacco use  - smoker PPD  - interested in quitting   - has used patched in the past, and quit for some time, would like to try again     Refills:  - allopurinol & motrin: gout/chronic pain  - vitamin D     PHQ2:  Negative. Considers self to be happy  Patient Active Problem List   Diagnosis    CAD (coronary artery disease)    Acute bacterial bronchitis    EPI (acute kidney injury) (CMS/[facility])    Arthritis of greater toe at metatarsophalangeal joint    CAD S/P percutaneous coronary angioplasty    Constipation    Snoring    Dyslipidemia    GERD (gastroesophageal reflux disease)    Gout    Hematuria    History of anemia    HLD (hyperlipidemia)    HTN (hypertension)    Hyperkalemia    Hypermetropia of both eyes    Hypersomnia    Toe joint pain, left    Myalgia    Myopia of both eyes with regular astigmatism    Nicotine dependence    Nocturnal hypoglycemia    NSTEMI, initial episode of care (CMS/[facility])    Obesity, " Class II, BMI 35-39.9    Other microscopic hematuria    Peripheral scars of retina    Post-tussive syncope    Presbyopia    Sleep apnea    Stage 3 chronic kidney disease (CMS/HCC)    Tinea pedis of both feet    Type 2 diabetes mellitus (CMS/HCC)    Viral URI with cough    Vitamin D deficiency      Current Outpatient Medications   Medication Instructions    acetaminophen (Pain Relief, acetaminophen,) 500 mg tablet Take 1-2 tablets every 6 hours as needed for pain.    allopurinol (ZYLOPRIM) 100 mg, oral, Daily, TAKE PER DIRECTED    amLODIPine (NORVASC) 10 mg, oral, Daily    aspirin 81 mg, oral, Daily    atorvastatin (LIPITOR) 40 mg, oral, Daily    calcium carb-vitamin D3-vit K2 600 mg-1,000 unit-90 mcg tablet     cholecalciferol (VITAMIN D-3) 2,000 Units, oral, Daily    diclofenac sodium (Voltaren) 1 % gel gel     FreeStyle glucose monitoring kit miscellaneous, W DEVICE  TRUE METRIX METER  DO PER DIRECTED    glucose 4 gram chewable tablet oral, As needed, TAKE PER DIRECTED FOR LOW SUGARS    hydroCHLOROthiazide (HYDRODIURIL) 25 mg, oral, Daily    insulin glargine (Lantus U-100 Insulin) 100 unit/mL injection subcutaneous, Every 24 hours, Take as directed per insulin instructions.    ketoconazole (NIZOral) 2 % cream     losartan (COZAAR) 25 mg, oral, Daily    metFORMIN (GLUCOPHAGE) 1,000 mg, oral, Every 12 hours    metoprolol succinate XL (TOPROL-XL) 100 mg, oral, Daily    nicotine (Nicoderm CQ) 14 mg/24 hr patch 1 patch, transdermal, Every 24 hours, DO PER DIRECTED 1 PATCH DAILY    nicotine (Nicoderm CQ) 14 mg/24 hr patch 1 patch, transdermal, Every 24 hours, Use for two weeks, then continue with step 3 patches.    nicotine (Nicoderm CQ) 21 mg/24 hr patch 1 patch, transdermal, Every 24 hours, Use for 6 weeks then continue with step 2 patches.    nicotine (Nicoderm CQ) 7 mg/24 hr patch 1 patch, transdermal, Every 24 hours, DO PER DIRECTED    nicotine (Nicoderm CQ) 7 mg/24 hr patch 1 patch, transdermal, Every 24 hours     "pen needle, diabetic (PEN NEEDLE MISC) miscellaneous, 32 BY 4 MM  USE WITH INSULIN PEN, ONCE DAILY  PER DIRECTED    Trulicity 1.5 mg, subcutaneous, Weekly      Past Surgical History:   Procedure Laterality Date    TONSILLECTOMY  11/13/2017    Tonsillectomy      Allergies   Allergen Reactions    Lisinopril Cough      Social History     Tobacco Use    Smoking status: Every Day     Types: Cigarettes    Smokeless tobacco: Never   Substance Use Topics    Alcohol use: Not Currently    Drug use: Never      Family History   Problem Relation Name Age of Onset    No Known Problems Mother      No Known Problems Father          Review of Systems  Ten point review of systems negative unless specified in HPI.   PHQ2- negative  Food insecurity: negative    Objective   Vitals: /68 (BP Location: Right arm, Patient Position: Sitting, BP Cuff Size: Adult long)   Pulse 71   Temp 36 °C (96.8 °F) (Skin)   Ht 1.753 m (5' 9\")   Wt 99.3 kg (218 lb 14.4 oz)   SpO2 97%   BMI 32.33 kg/m²      Physical Exam  General:  Well developed. Alert. No acute distress.  Skin:  Warm, dry. normal skin turgor. no rashes. no lesions.   Head: NCAT  EENT: MMM  Cardiovascular:  Regular rate and rhythm, normal S1 and S2, no gallops, no murmurs and no pericardial rub.  Pulmonary:  CTAB in all fields  Abdomen:  (+) BS. soft. non-tender. non-distended. no abdominal masses. no guarding or rigidity.  Neurologic:  grossly intact  Musculoskeletal: moves all extremities spontaneously  Psychiatric:  appropriate mood and affect    Assessment/Plan   67 yo M presenting for follow-up and  visit. Vitals stable and exam unremarkable.     Routine general medical examination at a health care facility  -     Cologuard® colon cancer screening; Future  -     Hemoglobin A1c; Future  -     CT lung screening low dose; Future  -     Renal function panel; Future  Vitamin D deficiency  -     cholecalciferol (Vitamin D-3) 25 MCG (1000 UT) tablet; Take 2 tablets (2,000 Units) " by mouth once daily.  Arthritis of greater toe at metatarsophalangeal joint  -     acetaminophen (Pain Relief, acetaminophen,) 500 mg tablet; Take 1-2 tablets every 6 hours as needed for pain.  - Recommended avoidance of motrin iso CKD  Chronic gout without tophus, unspecified cause, unspecified site  -     allopurinol (Zyloprim) 100 mg tablet; Take 1 tablet (100 mg) by mouth once daily. TAKE PER DIRECTED  Cigarette nicotine dependence without complication  -     nicotine (Nicoderm CQ) 21 mg/24 hr patch; Place 1 patch over 24 hours on the skin once every 24 hours. Use for 6 weeks then continue with step 2 patches.  -     nicotine (Nicoderm CQ) 14 mg/24 hr patch; Place 1 patch over 24 hours on the skin once every 24 hours. Use for two weeks, then continue with step 3 patches.  -     nicotine (Nicoderm CQ) 7 mg/24 hr patch; Place 1 patch over 24 hours on the skin once every 24 hours for 14 days.  - Congratulated effort. Discussed step down with nicotine patches and eventual goal of not using  - Discussed other options that can be used simultaneously and further resources, that he was not interested in at this time         RTC in 1 mo, or earlier as needed.  Attending Supervision: seen and discussed with attending physician Dr. Madhu Doherty.   Jessica Colvin MD  PGY-2, Family Medicine.

## 2023-12-15 NOTE — PROGRESS NOTES
I reviewed the resident/fellow's documentation and discussed the patient with the resident/fellow. I agree with the resident/fellow's medical decision making as documented in the note.    Madhu Doherty MD

## 2023-12-19 DIAGNOSIS — E11.9 TYPE 2 DIABETES MELLITUS WITHOUT COMPLICATION, WITHOUT LONG-TERM CURRENT USE OF INSULIN (MULTI): ICD-10-CM

## 2023-12-19 RX ORDER — DULAGLUTIDE 1.5 MG/.5ML
INJECTION, SOLUTION SUBCUTANEOUS
Qty: 2 ML | Refills: 2 | Status: SHIPPED | OUTPATIENT
Start: 2023-12-19 | End: 2024-01-22 | Stop reason: SDUPTHER

## 2024-01-18 NOTE — PROGRESS NOTES
FUV for diabetes. LV with me 10/2022.     History of Present Illness   68 y.o. male with hx of type 2 diabetes, CAD, HFpEF, HTN, HLD, CKD stage III.     Dx: 2018  HbA1c: 6.0% (10/20/2022), 6.0% (2022), 7.4% (2022), 10.9% (10/1/2021), 6.6% (2021)  Current regimen: metformin 1g QDAY, Trulicity 1.5 mg/week  Past medications: Humalog, Lantus  Complications: none  Comorbidities: HTN, HLD, CKD, CAD     SMB times a day  Fastins-110s  Dinner: no higher than 120s  Average: 112   Hypoglycemia: none    ROS  General: no fever or chills  CV: no chest pain   Respiratory: no shortness of breath  MSK: no lower extremity edema  Neuro: no headache or dizziness  See HPI for Endocrine ROS    Past Medical History:   Diagnosis Date    Chronic kidney disease, stage 3 unspecified (CMS/[facility]) 10/15/2018    Stage 3 chronic kidney disease    Chronic kidney disease, stage 3 unspecified (CMS/[facility]) 10/26/2018    Stage 3 chronic kidney disease    Old myocardial infarction     History of heart attack    Other conditions influencing health status 2022    Diabetes mellitus type 2, uncontrolled    Other disorders of lung     Lung trouble    Personal history of nicotine dependence 2019    History of nicotine dependence    Personal history of other diseases of the circulatory system     History of hypertension    Personal history of other diseases of the circulatory system 2019    History of hypertension    Personal history of other endocrine, nutritional and metabolic disease     History of high cholesterol    Personal history of other endocrine, nutritional and metabolic disease 2019    History of hypokalemia    Personal history of other endocrine, nutritional and metabolic disease     History of hypoglycemia    Personal history of other specified conditions     History of chest pain       Past Surgical History:   Procedure Laterality Date    TONSILLECTOMY  2017    Tonsillectomy       Social History  "    Socioeconomic History    Marital status: Single     Spouse name: Not on file    Number of children: Not on file    Years of education: Not on file    Highest education level: Not on file   Occupational History    Not on file   Tobacco Use    Smoking status: Every Day     Types: Cigarettes     Passive exposure: Never    Smokeless tobacco: Never   Substance and Sexual Activity    Alcohol use: Not Currently    Drug use: Never    Sexual activity: Not on file   Other Topics Concern    Not on file   Social History Narrative    Not on file     Social Determinants of Health     Financial Resource Strain: Not on file   Food Insecurity: Not on file   Transportation Needs: Not on file   Physical Activity: Not on file   Stress: Not on file   Social Connections: Not on file   Intimate Partner Violence: Not on file   Housing Stability: Not on file       Physical Exam   height is 1.753 m (5' 9\") and weight is 97.1 kg (214 lb). His blood pressure is 124/70 and his pulse is 83.   General: not in acute distress  HEENT: MARY PATEL  Thyroid: no goiter  Neuro: alert and oriented x 3    Current Outpatient Medications   Medication Sig Dispense Refill    allopurinol (Zyloprim) 100 mg tablet Take 1 tablet (100 mg) by mouth once daily. TAKE PER DIRECTED 90 tablet 3    amLODIPine (Norvasc) 10 mg tablet Take 1 tablet (10 mg) by mouth once daily. 90 tablet 1    aspirin 81 mg EC tablet Take 1 tablet (81 mg) by mouth once daily. 90 tablet 1    atorvastatin (Lipitor) 40 mg tablet Take 1 tablet (40 mg) by mouth once daily. 90 tablet 1    calcium carb-vitamin D3-vit K2 600 mg-1,000 unit-90 mcg tablet       cholecalciferol (Vitamin D-3) 25 MCG (1000 UT) tablet Take 2 tablets (2,000 Units) by mouth once daily. 90 tablet 0    diclofenac sodium (Voltaren) 1 % gel gel       glucose 4 gram chewable tablet Chew if needed for low blood sugar - see comments. TAKE PER DIRECTED FOR LOW SUGARS      hydroCHLOROthiazide (HYDRODiuril) 25 mg tablet Take 1 " tablet (25 mg) by mouth once daily. 90 tablet 1    ketoconazole (NIZOral) 2 % cream       losartan (Cozaar) 25 mg tablet Take 1 tablet (25 mg) by mouth once daily. 90 tablet 1    metFORMIN (Glucophage) 1,000 mg tablet Take 1 tablet (1,000 mg) by mouth every 12 hours. 180 tablet 1    metoprolol succinate XL (Toprol-XL) 100 mg 24 hr tablet Take 1 tablet (100 mg) by mouth once daily. 90 tablet 1    dulaglutide (Trulicity) 1.5 mg/0.5 mL pen injector injection Inject 1.5 mg under the skin 1 (one) time per week. 2 mL 5    pen needle, diabetic (PEN NEEDLE MISC) 32 BY 4 MM  USE WITH INSULIN PEN, ONCE DAILY  PER DIRECTED       No current facility-administered medications for this visit.       Assessment and Plan  68 y.o. male with hx of type 2 diabetes, CAD, HFpEF, HTN, HLD, CKD stage III, here for follow-up of type 2 diabetes.     1. Type 2 diabetes mellitus, uncontrolled  2. CKD stage III  HbA1c: 6.4% (1/22/2024), 6.0% (07/2023), 6.0% (10/20/2022), 6.0% (6/1/2022), 7.4% (1/7/2022), 10.9% (10/1/2021), 6.6% (04/2021)  Current regimen: metformin 1g QDAY, Trulicity 1.5 mg/week  Eye exam: no DR (11/2023)  Urine microalbumin: 7.6 ug/g (07/2023)  Foot exam:  with podiatry 06/2022  Lipids: HDL 37, LDL 60,  (07/2023), on atorvastatin 40 mg      A1c: 6.4% (1/22/2024)  Have not seen him since 10/2022, but provided instructions for down-titrating Lantus at .  He did discontinue Lantus after our  as instructed.  A1c still remains at goal without Lantus.  Reports that very occasionally he feels lightheaded at night. Does not check his blood sugars but drinks something because he thinks he may be hypoglycemic.  Advised that he check his blood sugar the next time this occurs. He is only on metformin and Trulicity now and hypoglycemia typically does not occur with these medications.     PLAN:  -continue Trulicity 1.5 mg/week  -continue metformin 1g QDAY  -continue atorvastatin 40 mg QDAY  -check blood sugars twice a day  (fasting and before dinner)  -due for podiatry visit (referred)  -check HbA1c, lipids, CMP, urine microalbumin before next visit     Follow-up in 6 months (labs prior)

## 2024-01-22 ENCOUNTER — OFFICE VISIT (OUTPATIENT)
Dept: ENDOCRINOLOGY | Facility: CLINIC | Age: 69
End: 2024-01-22
Payer: COMMERCIAL

## 2024-01-22 VITALS
HEIGHT: 69 IN | BODY MASS INDEX: 31.7 KG/M2 | DIASTOLIC BLOOD PRESSURE: 70 MMHG | WEIGHT: 214 LBS | SYSTOLIC BLOOD PRESSURE: 124 MMHG | HEART RATE: 83 BPM

## 2024-01-22 DIAGNOSIS — E11.9 TYPE 2 DIABETES MELLITUS WITHOUT COMPLICATION, WITHOUT LONG-TERM CURRENT USE OF INSULIN (MULTI): ICD-10-CM

## 2024-01-22 LAB — POC HEMOGLOBIN A1C: 6.4 % (ref 4.2–6.5)

## 2024-01-22 PROCEDURE — 3074F SYST BP LT 130 MM HG: CPT | Performed by: STUDENT IN AN ORGANIZED HEALTH CARE EDUCATION/TRAINING PROGRAM

## 2024-01-22 PROCEDURE — 1160F RVW MEDS BY RX/DR IN RCRD: CPT | Performed by: STUDENT IN AN ORGANIZED HEALTH CARE EDUCATION/TRAINING PROGRAM

## 2024-01-22 PROCEDURE — 1159F MED LIST DOCD IN RCRD: CPT | Performed by: STUDENT IN AN ORGANIZED HEALTH CARE EDUCATION/TRAINING PROGRAM

## 2024-01-22 PROCEDURE — 1126F AMNT PAIN NOTED NONE PRSNT: CPT | Performed by: STUDENT IN AN ORGANIZED HEALTH CARE EDUCATION/TRAINING PROGRAM

## 2024-01-22 PROCEDURE — 99214 OFFICE O/P EST MOD 30 MIN: CPT | Performed by: STUDENT IN AN ORGANIZED HEALTH CARE EDUCATION/TRAINING PROGRAM

## 2024-01-22 PROCEDURE — 83036 HEMOGLOBIN GLYCOSYLATED A1C: CPT | Performed by: STUDENT IN AN ORGANIZED HEALTH CARE EDUCATION/TRAINING PROGRAM

## 2024-01-22 PROCEDURE — 4010F ACE/ARB THERAPY RXD/TAKEN: CPT | Performed by: STUDENT IN AN ORGANIZED HEALTH CARE EDUCATION/TRAINING PROGRAM

## 2024-01-22 PROCEDURE — 3078F DIAST BP <80 MM HG: CPT | Performed by: STUDENT IN AN ORGANIZED HEALTH CARE EDUCATION/TRAINING PROGRAM

## 2024-01-22 RX ORDER — DULAGLUTIDE 1.5 MG/.5ML
1.5 INJECTION, SOLUTION SUBCUTANEOUS
Qty: 2 ML | Refills: 5 | Status: SHIPPED | OUTPATIENT
Start: 2024-01-22

## 2024-01-22 NOTE — PATIENT INSTRUCTIONS
Continue metformin 1 tab, once a day  Continue Trulicity as you are  Please schedule podiatry visit   Please have blood and urine tests a few days to a week before your next visit with me

## 2024-05-21 ENCOUNTER — OFFICE VISIT (OUTPATIENT)
Dept: PRIMARY CARE | Facility: CLINIC | Age: 69
End: 2024-05-21
Payer: COMMERCIAL

## 2024-05-21 VITALS
OXYGEN SATURATION: 97 % | BODY MASS INDEX: 32.44 KG/M2 | SYSTOLIC BLOOD PRESSURE: 112 MMHG | HEIGHT: 69 IN | TEMPERATURE: 98 F | WEIGHT: 219 LBS | DIASTOLIC BLOOD PRESSURE: 70 MMHG | HEART RATE: 81 BPM

## 2024-05-21 DIAGNOSIS — E55.9 VITAMIN D DEFICIENCY: ICD-10-CM

## 2024-05-21 DIAGNOSIS — E78.5 HYPERLIPIDEMIA, UNSPECIFIED HYPERLIPIDEMIA TYPE: ICD-10-CM

## 2024-05-21 DIAGNOSIS — E11.9 TYPE 2 DIABETES MELLITUS WITHOUT COMPLICATION, WITHOUT LONG-TERM CURRENT USE OF INSULIN (MULTI): ICD-10-CM

## 2024-05-21 DIAGNOSIS — M1A.9XX0 CHRONIC GOUT WITHOUT TOPHUS, UNSPECIFIED CAUSE, UNSPECIFIED SITE: ICD-10-CM

## 2024-05-21 DIAGNOSIS — I10 HYPERTENSION, UNSPECIFIED TYPE: ICD-10-CM

## 2024-05-21 DIAGNOSIS — I25.10 CORONARY ARTERY DISEASE INVOLVING NATIVE HEART WITHOUT ANGINA PECTORIS, UNSPECIFIED VESSEL OR LESION TYPE: ICD-10-CM

## 2024-05-21 DIAGNOSIS — Z71.6 ENCOUNTER FOR SMOKING CESSATION COUNSELING: Primary | ICD-10-CM

## 2024-05-21 PROCEDURE — 3074F SYST BP LT 130 MM HG: CPT | Performed by: STUDENT IN AN ORGANIZED HEALTH CARE EDUCATION/TRAINING PROGRAM

## 2024-05-21 PROCEDURE — 1157F ADVNC CARE PLAN IN RCRD: CPT | Performed by: STUDENT IN AN ORGANIZED HEALTH CARE EDUCATION/TRAINING PROGRAM

## 2024-05-21 PROCEDURE — 4010F ACE/ARB THERAPY RXD/TAKEN: CPT | Performed by: STUDENT IN AN ORGANIZED HEALTH CARE EDUCATION/TRAINING PROGRAM

## 2024-05-21 PROCEDURE — 1159F MED LIST DOCD IN RCRD: CPT | Performed by: STUDENT IN AN ORGANIZED HEALTH CARE EDUCATION/TRAINING PROGRAM

## 2024-05-21 PROCEDURE — 99213 OFFICE O/P EST LOW 20 MIN: CPT | Performed by: STUDENT IN AN ORGANIZED HEALTH CARE EDUCATION/TRAINING PROGRAM

## 2024-05-21 PROCEDURE — 3078F DIAST BP <80 MM HG: CPT | Performed by: STUDENT IN AN ORGANIZED HEALTH CARE EDUCATION/TRAINING PROGRAM

## 2024-05-21 PROCEDURE — 99406 BEHAV CHNG SMOKING 3-10 MIN: CPT | Performed by: STUDENT IN AN ORGANIZED HEALTH CARE EDUCATION/TRAINING PROGRAM

## 2024-05-21 PROCEDURE — 1126F AMNT PAIN NOTED NONE PRSNT: CPT | Performed by: STUDENT IN AN ORGANIZED HEALTH CARE EDUCATION/TRAINING PROGRAM

## 2024-05-21 RX ORDER — ATORVASTATIN CALCIUM 40 MG/1
40 TABLET, FILM COATED ORAL DAILY
Qty: 90 TABLET | Refills: 3 | Status: SHIPPED | OUTPATIENT
Start: 2024-05-21

## 2024-05-21 RX ORDER — AMLODIPINE BESYLATE 10 MG/1
10 TABLET ORAL DAILY
Qty: 90 TABLET | Refills: 3 | Status: SHIPPED | OUTPATIENT
Start: 2024-05-21

## 2024-05-21 RX ORDER — HYDROCHLOROTHIAZIDE 25 MG/1
25 TABLET ORAL DAILY
Qty: 90 TABLET | Refills: 3 | Status: SHIPPED | OUTPATIENT
Start: 2024-05-21

## 2024-05-21 RX ORDER — LOSARTAN POTASSIUM 25 MG/1
25 TABLET ORAL DAILY
Qty: 90 TABLET | Refills: 3 | Status: SHIPPED | OUTPATIENT
Start: 2024-05-21

## 2024-05-21 RX ORDER — METOPROLOL SUCCINATE 100 MG/1
100 TABLET, EXTENDED RELEASE ORAL DAILY
Qty: 90 TABLET | Refills: 1 | Status: SHIPPED | OUTPATIENT
Start: 2024-05-21

## 2024-05-21 RX ORDER — CHOLECALCIFEROL (VITAMIN D3) 25 MCG
2000 TABLET ORAL DAILY
Qty: 90 TABLET | Refills: 3 | Status: SHIPPED | OUTPATIENT
Start: 2024-05-21

## 2024-05-21 RX ORDER — NICOTINE 7MG/24HR
1 PATCH, TRANSDERMAL 24 HOURS TRANSDERMAL EVERY 24 HOURS
Qty: 14 PATCH | Refills: 3 | Status: SHIPPED | OUTPATIENT
Start: 2024-05-21

## 2024-05-21 RX ORDER — ASPIRIN 81 MG/1
81 TABLET ORAL DAILY
Qty: 90 TABLET | Refills: 3 | Status: SHIPPED | OUTPATIENT
Start: 2024-05-21

## 2024-05-21 RX ORDER — ALLOPURINOL 100 MG/1
100 TABLET ORAL DAILY
Qty: 90 TABLET | Refills: 3 | Status: SHIPPED | OUTPATIENT
Start: 2024-05-21

## 2024-05-21 RX ORDER — METFORMIN HYDROCHLORIDE 1000 MG/1
1000 TABLET ORAL
Qty: 90 TABLET | Refills: 3 | Status: SHIPPED | OUTPATIENT
Start: 2024-05-21

## 2024-05-21 ASSESSMENT — PAIN SCALES - GENERAL: PAINLEVEL: 0-NO PAIN

## 2024-05-21 ASSESSMENT — PATIENT HEALTH QUESTIONNAIRE - PHQ9
SUM OF ALL RESPONSES TO PHQ9 QUESTIONS 1 AND 2: 0
1. LITTLE INTEREST OR PLEASURE IN DOING THINGS: NOT AT ALL
2. FEELING DOWN, DEPRESSED OR HOPELESS: NOT AT ALL

## 2024-05-21 ASSESSMENT — ENCOUNTER SYMPTOMS
OCCASIONAL FEELINGS OF UNSTEADINESS: 0
LOSS OF SENSATION IN FEET: 0
DEPRESSION: 0

## 2024-05-21 NOTE — PROGRESS NOTES
"Subjective   Patient ID: Karri Ryan is a 68 y.o. male who presents for Med Refill.  HPI    Needs med refils  Hasn't ran out yet but he doesn't have refills left for his conditions    #htn  Endorses compliance with his medications  Denies chest pain,Sob, dizziness     #dm  Had an eye exam this year  He is podiatry every other month     #smoking cessation  \"I tried but I didn't make it\"  Was off for 3 weeks and then went back  He digests his food with a cig  One pack last last 4 days  He tried the nicotine patches and they worked    Needs low dose ct scheduled     Review of Systems  ROS negative except for above mentioned.      Objective   /70 (BP Location: Right arm, Patient Position: Sitting, BP Cuff Size: Adult)   Pulse 81   Temp 36.7 °C (98 °F) (Oral)   Ht 1.753 m (5' 9\")   Wt 99.3 kg (219 lb)   SpO2 97%   BMI 32.34 kg/m²     Physical Exam  General: Well developed, well nourished, alert and cooperative, appears to be in no acute distress.   HEENT: Normocephalic, atraumatic.  Cardiac: Normal S1 and S2. No S3, S4, or murmurs. Rhythm is regular. No peripheral edema, cyanosis, or pallor. Extremities warm and well perfused.   Lungs: Clear to auscultation bilaterally. No rales, rhonchi, wheezing, diminished breath sounds.   MSK: ROM intact spine and extremities.   Neuro: CN II-XII grossly intact.  Psych: Oriented to person, place, and time. Demonstrates good judgement and reason.     Assessment/Plan   Karri Ryan is a 68 y.o. male with PMH of CAD, nicotine dependence, gout, htn, HfpEF, CKD stage 3, HLD, and T2DM who presents for a follow up visit for refills on his chronic medications.    Problem List Items Addressed This Visit       CAD (coronary artery disease)  Chronic, stable     Relevant Medications    Cw metoprolol succinate XL (Toprol-XL) 100 mg 24 hr tablet    Cw atorvastatin (Lipitor) 40 mg tablet once daily     Cw aspirin 81 mg EC tablet  HTN  Chronic, at goal     Cw amLODIPine (Norvasc) 10 " mg tablet ocne daily     Gout  Chronic, at goal     Relevant Medications    Cw allopurinol (Zyloprim) 100 mg tablet once daily    HTN (hypertension)    Relevant Medications    metoprolol succinate XL (Toprol-XL) 100 mg 24 hr tablet    losartan (Cozaar) 25 mg tablet    hydroCHLOROthiazide (HYDRODiuril) 25 mg tablet    aspirin 81 mg EC tablet    amLODIPine (Norvasc) 10 mg tablet    Type 2 diabetes mellitus (Multi)    Relevant Medications    metFORMIN (Glucophage) 1,000 mg tablet    Vitamin D deficiency    Relevant Medications    cholecalciferol (Vitamin D-3) 25 MCG (1000 UT) tablet      Encounter for smoking cessation counseling    -  Primary    Relevant Medications    Start nicotine (Nicoderm CQ) 7 mg/24 hr patch  Spent 5 mins discussing smoking cessation  Patient has plan for quit date tomorrow           RTC in 3 months for a fu visit    Patient discussed with attending, Dr. Joel Kong MD  Family Medicine, PGY-3

## 2024-05-21 NOTE — PROGRESS NOTES
I reviewed the resident/fellow's documentation and discussed the patient with the resident/fellow. I agree with the resident/fellow's medical decision making as documented in the note.   VS noted; BP well controlled.  Would discontinue HCTZ given h/o gout.    Maddie Maldonado MD

## 2024-06-27 ENCOUNTER — LAB (OUTPATIENT)
Dept: LAB | Facility: LAB | Age: 69
End: 2024-06-27

## 2024-06-27 DIAGNOSIS — Z00.00 ROUTINE GENERAL MEDICAL EXAMINATION AT A HEALTH CARE FACILITY: ICD-10-CM

## 2024-06-27 DIAGNOSIS — E11.9 TYPE 2 DIABETES MELLITUS WITHOUT COMPLICATION, WITHOUT LONG-TERM CURRENT USE OF INSULIN (MULTI): ICD-10-CM

## 2024-06-27 LAB
ALBUMIN SERPL BCP-MCNC: 4.4 G/DL (ref 3.4–5)
ALP SERPL-CCNC: 83 U/L (ref 33–136)
ALT SERPL W P-5'-P-CCNC: 22 U/L (ref 10–52)
ANION GAP SERPL CALC-SCNC: 16 MMOL/L (ref 10–20)
AST SERPL W P-5'-P-CCNC: 18 U/L (ref 9–39)
BILIRUB SERPL-MCNC: 0.4 MG/DL (ref 0–1.2)
BUN SERPL-MCNC: 23 MG/DL (ref 6–23)
CALCIUM SERPL-MCNC: 9.8 MG/DL (ref 8.6–10.6)
CHLORIDE SERPL-SCNC: 105 MMOL/L (ref 98–107)
CHOLEST SERPL-MCNC: 124 MG/DL (ref 0–199)
CHOLESTEROL/HDL RATIO: 4
CO2 SERPL-SCNC: 24 MMOL/L (ref 21–32)
CREAT SERPL-MCNC: 1.54 MG/DL (ref 0.5–1.3)
CREAT UR-MCNC: 133.5 MG/DL (ref 20–370)
EGFRCR SERPLBLD CKD-EPI 2021: 48 ML/MIN/1.73M*2
EST. AVERAGE GLUCOSE BLD GHB EST-MCNC: 160 MG/DL
GLUCOSE SERPL-MCNC: 107 MG/DL (ref 74–99)
HBA1C MFR BLD: 7.2 %
HDLC SERPL-MCNC: 30.9 MG/DL
LDLC SERPL CALC-MCNC: 48 MG/DL
MICROALBUMIN UR-MCNC: <7 MG/L
MICROALBUMIN/CREAT UR: NORMAL MG/G{CREAT}
NON HDL CHOLESTEROL: 93 MG/DL (ref 0–149)
PHOSPHATE SERPL-MCNC: 3.5 MG/DL (ref 2.5–4.9)
POTASSIUM SERPL-SCNC: 4.6 MMOL/L (ref 3.5–5.3)
PROT SERPL-MCNC: 8.3 G/DL (ref 6.4–8.2)
SODIUM SERPL-SCNC: 140 MMOL/L (ref 136–145)
TRIGL SERPL-MCNC: 224 MG/DL (ref 0–149)
VLDL: 45 MG/DL (ref 0–40)

## 2024-06-27 PROCEDURE — 82570 ASSAY OF URINE CREATININE: CPT

## 2024-06-27 PROCEDURE — 80061 LIPID PANEL: CPT

## 2024-06-27 PROCEDURE — 82043 UR ALBUMIN QUANTITATIVE: CPT

## 2024-06-27 PROCEDURE — 80053 COMPREHEN METABOLIC PANEL: CPT

## 2024-06-27 PROCEDURE — 36415 COLL VENOUS BLD VENIPUNCTURE: CPT

## 2024-06-27 PROCEDURE — 84100 ASSAY OF PHOSPHORUS: CPT

## 2024-06-27 PROCEDURE — 83036 HEMOGLOBIN GLYCOSYLATED A1C: CPT

## 2024-07-17 NOTE — PROGRESS NOTES
FUV for diabetes. LV with me 01/2024.     History of Present Illness   68 y.o. male with hx of type 2 diabetes, CAD, HFpEF, HTN, HLD, CKD stage III.     Dx: 2018  HbA1c: 7.2% (6/27/2024), 6.4% (1/22/2024), 6.0% (07/2023), 6.0% (10/20/2022), 6.0% (6/1/2022), 7.4% (1/7/2022), 10.9% (10/1/2021), 6.6% (04/2021)  Current regimen: metformin 1g QDAY, Trulicity 1.5 mg/week  Past medications: Humalog, Lantus  Complications: none  Comorbidities: HTN, HLD, CKD, CAD     SMBG: has not checked in awhile    Hypoglycemia: none    ROS  General: no fever or chills  CV: no chest pain   Respiratory: no shortness of breath  MSK: no lower extremity edema  Neuro: no headache or dizziness  See HPI for Endocrine ROS    Past Medical History:   Diagnosis Date    Chronic kidney disease, stage 3 unspecified (Multi) 10/15/2018    Stage 3 chronic kidney disease    Chronic kidney disease, stage 3 unspecified (Multi) 10/26/2018    Stage 3 chronic kidney disease    Old myocardial infarction     History of heart attack    Other conditions influencing health status 05/12/2022    Diabetes mellitus type 2, uncontrolled    Other disorders of lung     Lung trouble    Personal history of nicotine dependence 09/17/2019    History of nicotine dependence    Personal history of other diseases of the circulatory system     History of hypertension    Personal history of other diseases of the circulatory system 09/17/2019    History of hypertension    Personal history of other endocrine, nutritional and metabolic disease     History of high cholesterol    Personal history of other endocrine, nutritional and metabolic disease 04/16/2019    History of hypokalemia    Personal history of other endocrine, nutritional and metabolic disease     History of hypoglycemia    Personal history of other specified conditions     History of chest pain       Past Surgical History:   Procedure Laterality Date    TONSILLECTOMY  11/13/2017    Tonsillectomy       Social History  "    Socioeconomic History    Marital status: Single     Spouse name: Not on file    Number of children: Not on file    Years of education: Not on file    Highest education level: Not on file   Occupational History    Not on file   Tobacco Use    Smoking status: Every Day     Types: Cigarettes     Passive exposure: Never    Smokeless tobacco: Never   Substance and Sexual Activity    Alcohol use: Not Currently    Drug use: Never    Sexual activity: Not on file   Other Topics Concern    Not on file   Social History Narrative    Not on file     Social Determinants of Health     Financial Resource Strain: Not on file   Food Insecurity: Not on file   Transportation Needs: Not on file   Physical Activity: Not on file   Stress: Not on file   Social Connections: Not on file   Intimate Partner Violence: Not on file   Housing Stability: Not on file       Physical Exam   height is 1.753 m (5' 9\") and weight is 101 kg (223 lb). His blood pressure is 107/68 and his pulse is 92.   General: not in acute distress  HEENT: AMANDA, EOMI  Thyroid: no goiter  Neuro: alert and oriented x 3    Current Outpatient Medications   Medication Sig Dispense Refill    allopurinol (Zyloprim) 100 mg tablet Take 1 tablet (100 mg) by mouth once daily. TAKE PER DIRECTED 90 tablet 3    amLODIPine (Norvasc) 10 mg tablet Take 1 tablet (10 mg) by mouth once daily. 90 tablet 3    aspirin 81 mg EC tablet Take 1 tablet (81 mg) by mouth once daily. 90 tablet 3    atorvastatin (Lipitor) 40 mg tablet Take 1 tablet (40 mg) by mouth once daily. 90 tablet 3    cholecalciferol (Vitamin D-3) 25 MCG (1000 UT) tablet Take 2 tablets (2,000 Units) by mouth once daily. (Patient taking differently: Take 1 tablet (1,000 Units) by mouth once daily.) 90 tablet 3    diclofenac sodium (Voltaren) 1 % gel gel       dulaglutide (Trulicity) 1.5 mg/0.5 mL pen injector injection Inject 1.5 mg under the skin 1 (one) time per week. 2 mL 5    glucose 4 gram chewable tablet Chew if needed " for low blood sugar - see comments. TAKE PER DIRECTED FOR LOW SUGARS      hydroCHLOROthiazide (HYDRODiuril) 25 mg tablet Take 1 tablet (25 mg) by mouth once daily. 90 tablet 3    ketoconazole (NIZOral) 2 % cream       losartan (Cozaar) 25 mg tablet Take 1 tablet (25 mg) by mouth once daily. 90 tablet 3    metFORMIN (Glucophage) 1,000 mg tablet Take 1 tablet (1,000 mg) by mouth once daily with breakfast. 90 tablet 3    metoprolol succinate XL (Toprol-XL) 100 mg 24 hr tablet Take 1 tablet (100 mg) by mouth once daily. 90 tablet 1    nicotine (Nicoderm CQ) 7 mg/24 hr patch Place 1 patch over 24 hours on the skin once every 24 hours. 14 patch 3     No current facility-administered medications for this visit.       Assessment and Plan  68 y.o. male with hx of type 2 diabetes, CAD, HFpEF, HTN, HLD, CKD stage III, here for follow-up of type 2 diabetes.     1. Type 2 diabetes mellitus, uncontrolled  2. CKD stage III  HbA1c: 7.2% (6/27/2024), 6.4% (1/22/2024), 6.0% (07/2023), 6.0% (10/20/2022), 6.0% (6/1/2022), 7.4% (1/7/2022), 10.9% (10/1/2021), 6.6% (04/2021)  Current regimen: metformin 1g QDAY, Trulicity 1.5 mg/week  Eye exam: no DR (11/2023)-scheduled for 11/2024  Urine microalbumin: neg (06/2024)  Foot exam: sees podiatry every other month (visiting podiatrist at his apartment)  Lipids: HDL 31, LDL 48,  (06/2024), on atorvastatin 40 mg      A1c: 7.2%  A1c higher now.  He admits to snacking more. He has gained about 8 lbs and he knows that gaining weight has always led to higher blood sugars in the past.  He will work on reducing snacking/carbs.  Also agreeable to increasing Trulicity.    Has not been checking blood glucose recently.  Strongly recommended he resume this.      PLAN:  -increase Trulicity to 3 mg/week, after 4 weeks increase to 4.5 mg/week  -continue metformin 1g QDAY  -continue atorvastatin 40 mg QDAY  -check blood sugars twice a day (fasting and before dinner)     Follow-up in 4 months

## 2024-07-22 ENCOUNTER — APPOINTMENT (OUTPATIENT)
Dept: ENDOCRINOLOGY | Facility: CLINIC | Age: 69
End: 2024-07-22
Payer: COMMERCIAL

## 2024-07-22 VITALS
HEART RATE: 92 BPM | DIASTOLIC BLOOD PRESSURE: 68 MMHG | SYSTOLIC BLOOD PRESSURE: 107 MMHG | WEIGHT: 223 LBS | BODY MASS INDEX: 33.03 KG/M2 | HEIGHT: 69 IN

## 2024-07-22 DIAGNOSIS — E11.9 TYPE 2 DIABETES MELLITUS WITHOUT COMPLICATION, WITHOUT LONG-TERM CURRENT USE OF INSULIN (MULTI): ICD-10-CM

## 2024-07-22 LAB — POC FINGERSTICK BLOOD GLUCOSE: 158 MG/DL (ref 70–100)

## 2024-07-22 PROCEDURE — 3051F HG A1C>EQUAL 7.0%<8.0%: CPT | Performed by: STUDENT IN AN ORGANIZED HEALTH CARE EDUCATION/TRAINING PROGRAM

## 2024-07-22 PROCEDURE — 82962 GLUCOSE BLOOD TEST: CPT | Performed by: STUDENT IN AN ORGANIZED HEALTH CARE EDUCATION/TRAINING PROGRAM

## 2024-07-22 PROCEDURE — 3048F LDL-C <100 MG/DL: CPT | Performed by: STUDENT IN AN ORGANIZED HEALTH CARE EDUCATION/TRAINING PROGRAM

## 2024-07-22 PROCEDURE — 99214 OFFICE O/P EST MOD 30 MIN: CPT | Performed by: STUDENT IN AN ORGANIZED HEALTH CARE EDUCATION/TRAINING PROGRAM

## 2024-07-22 PROCEDURE — 1159F MED LIST DOCD IN RCRD: CPT | Performed by: STUDENT IN AN ORGANIZED HEALTH CARE EDUCATION/TRAINING PROGRAM

## 2024-07-22 PROCEDURE — 3062F POS MACROALBUMINURIA REV: CPT | Performed by: STUDENT IN AN ORGANIZED HEALTH CARE EDUCATION/TRAINING PROGRAM

## 2024-07-22 PROCEDURE — 1157F ADVNC CARE PLAN IN RCRD: CPT | Performed by: STUDENT IN AN ORGANIZED HEALTH CARE EDUCATION/TRAINING PROGRAM

## 2024-07-22 PROCEDURE — 3078F DIAST BP <80 MM HG: CPT | Performed by: STUDENT IN AN ORGANIZED HEALTH CARE EDUCATION/TRAINING PROGRAM

## 2024-07-22 PROCEDURE — 3008F BODY MASS INDEX DOCD: CPT | Performed by: STUDENT IN AN ORGANIZED HEALTH CARE EDUCATION/TRAINING PROGRAM

## 2024-07-22 PROCEDURE — 3074F SYST BP LT 130 MM HG: CPT | Performed by: STUDENT IN AN ORGANIZED HEALTH CARE EDUCATION/TRAINING PROGRAM

## 2024-07-22 PROCEDURE — 4010F ACE/ARB THERAPY RXD/TAKEN: CPT | Performed by: STUDENT IN AN ORGANIZED HEALTH CARE EDUCATION/TRAINING PROGRAM

## 2024-07-22 RX ORDER — ISOPROPYL ALCOHOL 70 ML/100ML
SWAB TOPICAL
Qty: 100 EACH | Refills: 5 | Status: SHIPPED | OUTPATIENT
Start: 2024-07-22

## 2024-07-22 RX ORDER — CALCIUM CITRATE/VITAMIN D3 200MG-6.25
TABLET ORAL
Qty: 100 STRIP | Refills: 5 | Status: SHIPPED | OUTPATIENT
Start: 2024-07-22

## 2024-07-22 RX ORDER — DULAGLUTIDE 4.5 MG/.5ML
4.5 INJECTION, SOLUTION SUBCUTANEOUS
Qty: 2 ML | Refills: 5 | Status: SHIPPED | OUTPATIENT
Start: 2024-07-28

## 2024-07-22 RX ORDER — DULAGLUTIDE 3 MG/.5ML
3 INJECTION, SOLUTION SUBCUTANEOUS
Qty: 2 ML | Refills: 0 | Status: SHIPPED | OUTPATIENT
Start: 2024-07-22

## 2024-07-22 RX ORDER — LANCETS 33 GAUGE
EACH MISCELLANEOUS
Qty: 100 EACH | Refills: 5 | Status: SHIPPED | OUTPATIENT
Start: 2024-07-22

## 2024-07-22 NOTE — PATIENT INSTRUCTIONS
Increase Trulicity to 3 mg, once a week. After 4 weeks, increase to 4.5 mg, once a week. 4.5 mg will be your new maintenance dose.  Continue  metformin  Resume checking your blood sugars

## 2024-10-23 ENCOUNTER — APPOINTMENT (OUTPATIENT)
Dept: OPHTHALMOLOGY | Facility: CLINIC | Age: 69
End: 2024-10-23
Payer: COMMERCIAL

## 2024-11-01 ENCOUNTER — APPOINTMENT (OUTPATIENT)
Dept: OPHTHALMOLOGY | Facility: CLINIC | Age: 69
End: 2024-11-01
Payer: COMMERCIAL

## 2024-11-01 DIAGNOSIS — E11.9 CONTROLLED TYPE 2 DIABETES MELLITUS WITHOUT COMPLICATION, UNSPECIFIED WHETHER LONG TERM INSULIN USE (MULTI): Primary | ICD-10-CM

## 2024-11-01 DIAGNOSIS — H25.813 COMBINED FORMS OF AGE-RELATED CATARACT, BILATERAL: ICD-10-CM

## 2024-11-01 DIAGNOSIS — H52.03 HYPEROPIA OF BOTH EYES: ICD-10-CM

## 2024-11-01 PROCEDURE — 92014 COMPRE OPH EXAM EST PT 1/>: CPT | Performed by: OPHTHALMOLOGY

## 2024-11-01 ASSESSMENT — REFRACTION_MANIFEST
OS_CYLINDER: -1.25
OS_SPHERE: +2.00
OS_SPHERE: +1.50
OD_SPHERE: +1.75
OD_AXIS: 100
OD_CYLINDER: -1.25
OS_AXIS: 050
OD_AXIS: 100
OD_CYLINDER: -1.75
OS_CYLINDER: -1.50
OS_AXIS: 050
OD_SPHERE: +2.25
OS_ADD: +2.50
OD_ADD: +2.50

## 2024-11-01 ASSESSMENT — CUP TO DISC RATIO
OS_RATIO: 0.3
OD_RATIO: 0.3

## 2024-11-01 ASSESSMENT — VISUAL ACUITY
OS_SC: 20/30
OD_PH_SC: 20/20
METHOD: SNELLEN - LINEAR
OD_SC: 20/40

## 2024-11-01 ASSESSMENT — ENCOUNTER SYMPTOMS
CONSTITUTIONAL NEGATIVE: 0
EYES NEGATIVE: 1
HEMATOLOGIC/LYMPHATIC NEGATIVE: 0
GASTROINTESTINAL NEGATIVE: 0
CARDIOVASCULAR NEGATIVE: 0
RESPIRATORY NEGATIVE: 0
PSYCHIATRIC NEGATIVE: 0
ENDOCRINE NEGATIVE: 0
NEUROLOGICAL NEGATIVE: 0
ALLERGIC/IMMUNOLOGIC NEGATIVE: 0
MUSCULOSKELETAL NEGATIVE: 0

## 2024-11-01 ASSESSMENT — TONOMETRY
OD_IOP_MMHG: 26
IOP_METHOD: TONOPEN
OD_IOP_MMHG: 22
OD_IOP_MMHG: 12
OS_IOP_MMHG: 27
OS_IOP_MMHG: 22
OS_IOP_MMHG: 12
IOP_METHOD: TONOPEN
IOP_METHOD: GOLDMANN APPLANATION

## 2024-11-01 ASSESSMENT — CONF VISUAL FIELD
OS_INFERIOR_NASAL_RESTRICTION: 0
OD_NORMAL: 1
OS_SUPERIOR_NASAL_RESTRICTION: 0
OD_INFERIOR_NASAL_RESTRICTION: 0
OD_SUPERIOR_TEMPORAL_RESTRICTION: 0
OD_SUPERIOR_NASAL_RESTRICTION: 0
OS_SUPERIOR_TEMPORAL_RESTRICTION: 0
OS_INFERIOR_TEMPORAL_RESTRICTION: 0
OD_INFERIOR_TEMPORAL_RESTRICTION: 0
OS_NORMAL: 1

## 2024-11-01 ASSESSMENT — REFRACTION_WEARINGRX
OS_AXIS: 050
OS_SPHERE: +2.00
OD_ADD: +2.50
OS_CYLINDER: -1.50
OD_SPHERE: +1.75
OD_CYLINDER: -1.25
OD_AXIS: 100
OS_ADD: +2.50

## 2024-11-01 ASSESSMENT — EXTERNAL EXAM - LEFT EYE: OS_EXAM: NORMAL

## 2024-11-01 ASSESSMENT — EXTERNAL EXAM - RIGHT EYE: OD_EXAM: NORMAL

## 2024-11-01 ASSESSMENT — SLIT LAMP EXAM - LIDS
COMMENTS: NORMAL
COMMENTS: NORMAL

## 2024-12-04 NOTE — PROGRESS NOTES
FUV for diabetes. LV with me 2024.     History of Present Illness   69 y.o. male with hx of type 2 diabetes, CAD, HFpEF, HTN, HLD, CKD stage III.     Dx: 2018  HbA1c: 7.1% (2024), 7.2% (2024), 6.4% (2024), 6.0% (2023), 6.0% (10/20/2022), 6.0% (2022), 7.4% (2022), 10.9% (10/1/2021), 6.6% (2021)  Current regimen: metformin 1g QDAY, Trulicity 4.5 mg/week  Past medications: Humalog, Lantus  Complications: none  Comorbidities: HTN, HLD, CKD, CAD     SMBG: once a day  Fastins-120s  If he has a bedtime snack, fasting BG 130s-140s    Hypoglycemia: none    ROS  General: no fever or chills  CV: no chest pain   Respiratory: no shortness of breath  MSK: no lower extremity edema  Neuro: no headache or dizziness  See HPI for Endocrine ROS    Past Medical History:   Diagnosis Date    Chronic kidney disease, stage 3 unspecified (Multi) 10/15/2018    Stage 3 chronic kidney disease    Chronic kidney disease, stage 3 unspecified (Multi) 10/26/2018    Stage 3 chronic kidney disease    Old myocardial infarction     History of heart attack    Other conditions influencing health status 2022    Diabetes mellitus type 2, uncontrolled    Other disorders of lung     Lung trouble    Personal history of nicotine dependence 2019    History of nicotine dependence    Personal history of other diseases of the circulatory system     History of hypertension    Personal history of other diseases of the circulatory system 2019    History of hypertension    Personal history of other endocrine, nutritional and metabolic disease     History of high cholesterol    Personal history of other endocrine, nutritional and metabolic disease 2019    History of hypokalemia    Personal history of other endocrine, nutritional and metabolic disease     History of hypoglycemia    Personal history of other specified conditions     History of chest pain       Past Surgical History:   Procedure Laterality Date     TONSILLECTOMY  11/13/2017    Tonsillectomy       Social History     Socioeconomic History    Marital status: Single     Spouse name: Not on file    Number of children: Not on file    Years of education: Not on file    Highest education level: Not on file   Occupational History    Not on file   Tobacco Use    Smoking status: Every Day     Types: Cigarettes     Passive exposure: Never    Smokeless tobacco: Never   Vaping Use    Vaping status: Never Used   Substance and Sexual Activity    Alcohol use: Not Currently    Drug use: Never    Sexual activity: Not on file   Other Topics Concern    Not on file   Social History Narrative    Not on file     Social Drivers of Health     Financial Resource Strain: Not on file   Food Insecurity: Not on file   Transportation Needs: Not on file   Physical Activity: Not on file   Stress: Not on file   Social Connections: Not on file   Intimate Partner Violence: Not on file   Housing Stability: Not on file       Physical Exam   weight is 101 kg (223 lb). His blood pressure is 104/59 and his pulse is 90.   General: not in acute distress  HEENT: MARY PATEL  Thyroid: no goiter  Neuro: alert and oriented x 3    Current Outpatient Medications   Medication Sig Dispense Refill    alcohol swabs (Alcohol Pads) pads, medicated Use to check blood glucose twice a day 100 each 5    allopurinol (Zyloprim) 100 mg tablet Take 1 tablet (100 mg) by mouth once daily. TAKE PER DIRECTED 90 tablet 3    amLODIPine (Norvasc) 10 mg tablet Take 1 tablet (10 mg) by mouth once daily. 90 tablet 3    ammonium lactate (Lac-Hydrin) 12 % lotion Apply topically.      aspirin 81 mg EC tablet Take 1 tablet (81 mg) by mouth once daily. 90 tablet 3    atorvastatin (Lipitor) 40 mg tablet Take 1 tablet (40 mg) by mouth once daily. 90 tablet 3    cholecalciferol (Vitamin D-3) 25 MCG (1000 UT) tablet Take 2 tablets (2,000 Units) by mouth once daily. 90 tablet 3    diclofenac sodium (Voltaren) 1 % gel gel       glucose 4  gram chewable tablet Chew if needed for low blood sugar - see comments. TAKE PER DIRECTED FOR LOW SUGARS      hydroCHLOROthiazide (HYDRODiuril) 25 mg tablet Take 1 tablet (25 mg) by mouth once daily. 90 tablet 3    ketoconazole (NIZOral) 2 % cream       losartan (Cozaar) 25 mg tablet Take 1 tablet (25 mg) by mouth once daily. 90 tablet 3    metFORMIN (Glucophage) 1,000 mg tablet Take 1 tablet (1,000 mg) by mouth once daily with breakfast. 90 tablet 3    metoprolol succinate XL (Toprol-XL) 100 mg 24 hr tablet Take 1 tablet (100 mg) by mouth once daily. 90 tablet 1    dulaglutide (Trulicity) 4.5 mg/0.5 mL pen injector Inject 4.5 mg under the skin 1 (one) time per week. 6 mL 3    nicotine (Nicoderm CQ) 7 mg/24 hr patch Place 1 patch over 24 hours on the skin once every 24 hours. (Patient not taking: Reported on 12/9/2024) 14 patch 3    True Metrix Glucose Test Strip strip Use to check blood glucose twice a day 200 strip 3    TRUEplus Lancets 33 gauge misc Use to check blood glucose twice a day 200 each 3     No current facility-administered medications for this visit.       Assessment and Plan  69 y.o. male with hx of type 2 diabetes, CAD, HFpEF, HTN, HLD, CKD stage III, here for follow-up of type 2 diabetes.     1. Type 2 diabetes mellitus, uncontrolled  2. CKD stage III  HbA1c: 7.1% (12/9/2024), 7.2% (6/27/2024), 6.4% (1/22/2024), 6.0% (07/2023), 6.0% (10/20/2022), 6.0% (6/1/2022), 7.4% (1/7/2022), 10.9% (10/1/2021), 6.6% (04/2021)  Current regimen: metformin 1g QDAY, Trulicity 4.5 mg/week  Eye exam: no DR (11/2024)  Urine microalbumin: neg (06/2024)  Foot exam: due  Lipids: HDL 31, LDL 48,  (06/2024), on atorvastatin 40 mg      A1c: 7.1% today  Has resumed checking BG again.  Fasting BG mostly in target range except when he has a bedtime snack. He has realized which foods increase fasting BG and has reduced these.  Advised patient to start checking 2h post dinner BG to get a better understanding of how his  dinner affects his BG.     PLAN:  -continue Trulicity 4.5 mg/week  -continue metformin 1g QDAY  -continue atorvastatin 40 mg QDAY  -check blood sugars once a day (alternate fasting and 2H post dinner)  -referred to podiatry     Follow-up in 4 months

## 2024-12-09 ENCOUNTER — APPOINTMENT (OUTPATIENT)
Dept: ENDOCRINOLOGY | Facility: CLINIC | Age: 69
End: 2024-12-09
Payer: COMMERCIAL

## 2024-12-09 VITALS
DIASTOLIC BLOOD PRESSURE: 59 MMHG | SYSTOLIC BLOOD PRESSURE: 104 MMHG | BODY MASS INDEX: 32.93 KG/M2 | WEIGHT: 223 LBS | HEART RATE: 90 BPM

## 2024-12-09 DIAGNOSIS — E11.9 TYPE 2 DIABETES MELLITUS WITHOUT COMPLICATION, WITHOUT LONG-TERM CURRENT USE OF INSULIN (MULTI): ICD-10-CM

## 2024-12-09 LAB — POC HEMOGLOBIN A1C: 7.1 % (ref 4.2–6.5)

## 2024-12-09 PROCEDURE — 3074F SYST BP LT 130 MM HG: CPT | Performed by: STUDENT IN AN ORGANIZED HEALTH CARE EDUCATION/TRAINING PROGRAM

## 2024-12-09 PROCEDURE — 3078F DIAST BP <80 MM HG: CPT | Performed by: STUDENT IN AN ORGANIZED HEALTH CARE EDUCATION/TRAINING PROGRAM

## 2024-12-09 PROCEDURE — 3051F HG A1C>EQUAL 7.0%<8.0%: CPT | Performed by: STUDENT IN AN ORGANIZED HEALTH CARE EDUCATION/TRAINING PROGRAM

## 2024-12-09 PROCEDURE — 99214 OFFICE O/P EST MOD 30 MIN: CPT | Performed by: STUDENT IN AN ORGANIZED HEALTH CARE EDUCATION/TRAINING PROGRAM

## 2024-12-09 PROCEDURE — 3048F LDL-C <100 MG/DL: CPT | Performed by: STUDENT IN AN ORGANIZED HEALTH CARE EDUCATION/TRAINING PROGRAM

## 2024-12-09 PROCEDURE — 1159F MED LIST DOCD IN RCRD: CPT | Performed by: STUDENT IN AN ORGANIZED HEALTH CARE EDUCATION/TRAINING PROGRAM

## 2024-12-09 PROCEDURE — 3062F POS MACROALBUMINURIA REV: CPT | Performed by: STUDENT IN AN ORGANIZED HEALTH CARE EDUCATION/TRAINING PROGRAM

## 2024-12-09 PROCEDURE — 83036 HEMOGLOBIN GLYCOSYLATED A1C: CPT | Performed by: STUDENT IN AN ORGANIZED HEALTH CARE EDUCATION/TRAINING PROGRAM

## 2024-12-09 PROCEDURE — 1157F ADVNC CARE PLAN IN RCRD: CPT | Performed by: STUDENT IN AN ORGANIZED HEALTH CARE EDUCATION/TRAINING PROGRAM

## 2024-12-09 PROCEDURE — 4010F ACE/ARB THERAPY RXD/TAKEN: CPT | Performed by: STUDENT IN AN ORGANIZED HEALTH CARE EDUCATION/TRAINING PROGRAM

## 2024-12-09 RX ORDER — DULAGLUTIDE 4.5 MG/.5ML
4.5 INJECTION, SOLUTION SUBCUTANEOUS
Qty: 6 ML | Refills: 3 | Status: SHIPPED | OUTPATIENT
Start: 2024-12-09

## 2024-12-09 RX ORDER — AMMONIUM LACTATE 12 G/100G
LOTION TOPICAL
COMMUNITY
Start: 2024-10-08

## 2024-12-09 RX ORDER — CALCIUM CITRATE/VITAMIN D3 200MG-6.25
TABLET ORAL
Qty: 200 STRIP | Refills: 3 | Status: SHIPPED | OUTPATIENT
Start: 2024-12-09

## 2024-12-09 RX ORDER — LANCETS 33 GAUGE
EACH MISCELLANEOUS
Qty: 200 EACH | Refills: 3 | Status: SHIPPED | OUTPATIENT
Start: 2024-12-09

## 2024-12-09 NOTE — PATIENT INSTRUCTIONS
Continue Trulicity and metformin as you are  Schedule podiatry visit at your earliest convenience    Checking your blood sugars:  Alternate between checking first thing in the morning BEFORE breakfast and 2 hours AFTER dinner.    Goal for fastin-130  Goal for 2 hours after dinner: < 180

## 2025-04-09 NOTE — PROGRESS NOTES
FUV for diabetes. LV with me 2024.     History of Present Illness   69 y.o. male with hx of type 2 diabetes, CAD, HFpEF, HTN, HLD, CKD stage III.     Dx: 2018  HbA1c: 6.6% (2025), 7.1% (2025), 7.1% (2024), 7.2% (2024), 6.4% (2024), 6.0% (2023), 6.0% (10/20/2022), 6.0% (2022), 7.4% (2022), 10.9% (10/1/2021), 6.6% (2021)  Current regimen: metformin 1g QDAY, Trulicity 4.5 mg/week  Past medications: Humalog, Lantus  Complications: none  Comorbidities: HTN, HLD, CKD, CAD     SMBG: once a day  Fastins-130s  Before dinner: ~130s    Hypoglycemia: none    Today:  -doing well    ROS  General: no fever or chills  CV: no chest pain   Respiratory: no shortness of breath  MSK: no lower extremity edema  Neuro: no headache or dizziness  See HPI for Endocrine ROS    Past Medical History:   Diagnosis Date    Chronic kidney disease, stage 3 unspecified (Multi) 10/15/2018    Stage 3 chronic kidney disease    Chronic kidney disease, stage 3 unspecified (Multi) 10/26/2018    Stage 3 chronic kidney disease    Old myocardial infarction     History of heart attack    Other conditions influencing health status 2022    Diabetes mellitus type 2, uncontrolled    Other disorders of lung     Lung trouble    Personal history of nicotine dependence 2019    History of nicotine dependence    Personal history of other diseases of the circulatory system     History of hypertension    Personal history of other diseases of the circulatory system 2019    History of hypertension    Personal history of other endocrine, nutritional and metabolic disease     History of high cholesterol    Personal history of other endocrine, nutritional and metabolic disease 2019    History of hypokalemia    Personal history of other endocrine, nutritional and metabolic disease     History of hypoglycemia    Personal history of other specified conditions     History of chest pain       Past Surgical History:  "  Procedure Laterality Date    TONSILLECTOMY  11/13/2017    Tonsillectomy       Social History     Socioeconomic History    Marital status: Single     Spouse name: Not on file    Number of children: Not on file    Years of education: Not on file    Highest education level: Not on file   Occupational History    Not on file   Tobacco Use    Smoking status: Every Day     Types: Cigarettes     Passive exposure: Never    Smokeless tobacco: Never    Tobacco comments:     vape   Vaping Use    Vaping status: Never Used   Substance and Sexual Activity    Alcohol use: Not Currently    Drug use: Never    Sexual activity: Not on file   Other Topics Concern    Not on file   Social History Narrative    Not on file     Social Drivers of Health     Financial Resource Strain: Not on file   Food Insecurity: Not on file   Transportation Needs: Not on file   Physical Activity: Not on file   Stress: Not on file   Social Connections: Not on file   Intimate Partner Violence: Not on file   Housing Stability: Not on file       Physical Exam   height is 1.753 m (5' 9\") and weight is 98 kg (216 lb). His blood pressure is 131/68 and his pulse is 97.   General: not in acute distress  HEENT: MARY PATEL  Thyroid: no goiter  Neuro: alert and oriented x 3    Current Outpatient Medications   Medication Sig Dispense Refill    alcohol swabs (Alcohol Pads) pads, medicated Use to check blood glucose twice a day 100 each 5    allopurinol (Zyloprim) 100 mg tablet Take 1 tablet (100 mg) by mouth once daily. TAKE PER DIRECTED 90 tablet 3    amLODIPine (Norvasc) 10 mg tablet Take 1 tablet (10 mg) by mouth once daily. 90 tablet 3    aspirin 81 mg EC tablet Take 1 tablet (81 mg) by mouth once daily. 90 tablet 3    atorvastatin (Lipitor) 40 mg tablet Take 1 tablet (40 mg) by mouth once daily. 90 tablet 3    cholecalciferol (Vitamin D-3) 25 MCG (1000 UT) tablet Take 2 tablets (2,000 Units) by mouth once daily. 90 tablet 3    diclofenac sodium (Voltaren) 1 % gel " gel       dulaglutide (Trulicity) 4.5 mg/0.5 mL pen injector Inject 4.5 mg under the skin 1 (one) time per week. 6 mL 3    glucose 4 gram chewable tablet Chew if needed for low blood sugar - see comments. TAKE PER DIRECTED FOR LOW SUGARS      hydroCHLOROthiazide (HYDRODiuril) 25 mg tablet Take 1 tablet (25 mg) by mouth once daily. 90 tablet 3    ketoconazole (NIZOral) 2 % cream       losartan (Cozaar) 25 mg tablet Take 1 tablet (25 mg) by mouth once daily. 90 tablet 3    metFORMIN (Glucophage) 1,000 mg tablet Take 1 tablet (1,000 mg) by mouth once daily with breakfast. 90 tablet 3    metoprolol succinate XL (Toprol-XL) 100 mg 24 hr tablet Take 1 tablet (100 mg) by mouth once daily. 90 tablet 1    True Metrix Glucose Test Strip strip Use to check blood glucose twice a day 200 strip 3    TRUEplus Lancets 33 gauge misc Use to check blood glucose twice a day 200 each 3    ammonium lactate (Lac-Hydrin) 12 % lotion Apply topically. (Patient not taking: Reported on 4/14/2025)      nicotine (Nicoderm CQ) 7 mg/24 hr patch Place 1 patch over 24 hours on the skin once every 24 hours. (Patient not taking: Reported on 4/14/2025) 14 patch 3     No current facility-administered medications for this visit.       Assessment and Plan  69 y.o. male with hx of type 2 diabetes, CAD, HFpEF, HTN, HLD, CKD stage III, here for follow-up of type 2 diabetes.     1. Type 2 diabetes mellitus, uncontrolled  2. CKD stage III  HbA1c: 6.6% (04/2025), 7.1% (04/2025), 7.1% (12/9/2024), 7.2% (6/27/2024), 6.4% (1/22/2024), 6.0% (07/2023), 6.0% (10/20/2022), 6.0% (6/1/2022), 7.4% (1/7/2022), 10.9% (10/1/2021), 6.6% (04/2021)  Current regimen: metformin 1g QDAY, Trulicity 4.5 mg/week  Eye exam: no DR (11/2024)  Urine microalbumin: neg (06/2024)  Foot exam: has home podiatry visits every other month  Lipids: HDL 31, LDL 48,  (06/2024), on atorvastatin 40 mg      A1c: 6.6% today.  Has been monitoring blood sugars more often and adjusted his food  intake which has improved his glycemic control. He was afraid that a BG in the low 100s at bedtime would cause him to have hypoglycemia overnight, so he has been eating a small bedtime snack for his. I reassured him that he is not on any diabetes medications that cause hypoglycemia and a fasting BG of  is his goal.   No changes to regimen needed today.     PLAN:  -continue Trulicity 4.5 mg/week  -continue metformin 1g QDAY  -continue atorvastatin 40 mg QDAY  -check blood sugars once a day (alternate fasting and 2H post dinner)     Follow-up in 4 months with Dr. Bustamante ([facility])

## 2025-04-14 ENCOUNTER — APPOINTMENT (OUTPATIENT)
Dept: ENDOCRINOLOGY | Facility: CLINIC | Age: 70
End: 2025-04-14
Payer: COMMERCIAL

## 2025-04-14 VITALS
WEIGHT: 216 LBS | HEART RATE: 97 BPM | SYSTOLIC BLOOD PRESSURE: 131 MMHG | DIASTOLIC BLOOD PRESSURE: 68 MMHG | HEIGHT: 69 IN | BODY MASS INDEX: 31.99 KG/M2

## 2025-04-14 DIAGNOSIS — E11.9 TYPE 2 DIABETES MELLITUS WITHOUT COMPLICATION, WITHOUT LONG-TERM CURRENT USE OF INSULIN: ICD-10-CM

## 2025-04-14 LAB — POC HEMOGLOBIN A1C: 6.6 % (ref 4.2–6.5)

## 2025-04-14 PROCEDURE — 3075F SYST BP GE 130 - 139MM HG: CPT | Performed by: STUDENT IN AN ORGANIZED HEALTH CARE EDUCATION/TRAINING PROGRAM

## 2025-04-14 PROCEDURE — 83036 HEMOGLOBIN GLYCOSYLATED A1C: CPT | Performed by: STUDENT IN AN ORGANIZED HEALTH CARE EDUCATION/TRAINING PROGRAM

## 2025-04-14 PROCEDURE — 1157F ADVNC CARE PLAN IN RCRD: CPT | Performed by: STUDENT IN AN ORGANIZED HEALTH CARE EDUCATION/TRAINING PROGRAM

## 2025-04-14 PROCEDURE — 1159F MED LIST DOCD IN RCRD: CPT | Performed by: STUDENT IN AN ORGANIZED HEALTH CARE EDUCATION/TRAINING PROGRAM

## 2025-04-14 PROCEDURE — 99214 OFFICE O/P EST MOD 30 MIN: CPT | Performed by: STUDENT IN AN ORGANIZED HEALTH CARE EDUCATION/TRAINING PROGRAM

## 2025-04-14 PROCEDURE — 3044F HG A1C LEVEL LT 7.0%: CPT | Performed by: STUDENT IN AN ORGANIZED HEALTH CARE EDUCATION/TRAINING PROGRAM

## 2025-04-14 PROCEDURE — 3008F BODY MASS INDEX DOCD: CPT | Performed by: STUDENT IN AN ORGANIZED HEALTH CARE EDUCATION/TRAINING PROGRAM

## 2025-04-14 PROCEDURE — 1126F AMNT PAIN NOTED NONE PRSNT: CPT | Performed by: STUDENT IN AN ORGANIZED HEALTH CARE EDUCATION/TRAINING PROGRAM

## 2025-04-14 PROCEDURE — 4010F ACE/ARB THERAPY RXD/TAKEN: CPT | Performed by: STUDENT IN AN ORGANIZED HEALTH CARE EDUCATION/TRAINING PROGRAM

## 2025-04-14 PROCEDURE — 3078F DIAST BP <80 MM HG: CPT | Performed by: STUDENT IN AN ORGANIZED HEALTH CARE EDUCATION/TRAINING PROGRAM

## 2025-04-14 RX ORDER — METFORMIN HYDROCHLORIDE 1000 MG/1
1000 TABLET ORAL
Qty: 90 TABLET | Refills: 3 | Status: SHIPPED | OUTPATIENT
Start: 2025-04-14

## 2025-04-14 ASSESSMENT — PATIENT HEALTH QUESTIONNAIRE - PHQ9
1. LITTLE INTEREST OR PLEASURE IN DOING THINGS: NOT AT ALL
SUM OF ALL RESPONSES TO PHQ9 QUESTIONS 1 & 2: 0
2. FEELING DOWN, DEPRESSED OR HOPELESS: NOT AT ALL

## 2025-04-14 ASSESSMENT — PAIN SCALES - GENERAL: PAINLEVEL_OUTOF10: 0-NO PAIN

## 2025-04-25 ENCOUNTER — OFFICE VISIT (OUTPATIENT)
Facility: HOSPITAL | Age: 70
End: 2025-04-25
Payer: COMMERCIAL

## 2025-04-25 ENCOUNTER — HOSPITAL ENCOUNTER (OUTPATIENT)
Dept: RADIOLOGY | Facility: HOSPITAL | Age: 70
Discharge: HOME | End: 2025-04-25
Payer: COMMERCIAL

## 2025-04-25 VITALS
DIASTOLIC BLOOD PRESSURE: 80 MMHG | TEMPERATURE: 97.8 F | SYSTOLIC BLOOD PRESSURE: 131 MMHG | HEART RATE: 90 BPM | BODY MASS INDEX: 31.7 KG/M2 | RESPIRATION RATE: 18 BRPM | WEIGHT: 214 LBS | HEIGHT: 69 IN | OXYGEN SATURATION: 98 %

## 2025-04-25 DIAGNOSIS — E11.9 TYPE 2 DIABETES MELLITUS WITHOUT COMPLICATION, UNSPECIFIED WHETHER LONG TERM INSULIN USE: ICD-10-CM

## 2025-04-25 DIAGNOSIS — E55.9 VITAMIN D DEFICIENCY: ICD-10-CM

## 2025-04-25 DIAGNOSIS — M1A.9XX0 CHRONIC GOUT WITHOUT TOPHUS, UNSPECIFIED CAUSE, UNSPECIFIED SITE: ICD-10-CM

## 2025-04-25 DIAGNOSIS — I10 HYPERTENSION, UNSPECIFIED TYPE: ICD-10-CM

## 2025-04-25 DIAGNOSIS — I25.10 CORONARY ARTERY DISEASE INVOLVING NATIVE HEART WITHOUT ANGINA PECTORIS, UNSPECIFIED VESSEL OR LESION TYPE: ICD-10-CM

## 2025-04-25 DIAGNOSIS — E78.5 HYPERLIPIDEMIA, UNSPECIFIED HYPERLIPIDEMIA TYPE: ICD-10-CM

## 2025-04-25 DIAGNOSIS — N18.30 STAGE 3 CHRONIC KIDNEY DISEASE, UNSPECIFIED WHETHER STAGE 3A OR 3B CKD (MULTI): ICD-10-CM

## 2025-04-25 DIAGNOSIS — M25.579 PAIN IN UNSPECIFIED ANKLE AND JOINTS OF UNSPECIFIED FOOT: ICD-10-CM

## 2025-04-25 DIAGNOSIS — F17.210 CIGARETTE NICOTINE DEPENDENCE WITHOUT COMPLICATION: ICD-10-CM

## 2025-04-25 DIAGNOSIS — M10.9 GOUT, UNSPECIFIED CAUSE, UNSPECIFIED CHRONICITY, UNSPECIFIED SITE: Primary | ICD-10-CM

## 2025-04-25 PROCEDURE — 73630 X-RAY EXAM OF FOOT: CPT | Mod: 50

## 2025-04-25 RX ORDER — HYDROCHLOROTHIAZIDE 25 MG/1
25 TABLET ORAL DAILY
Qty: 90 TABLET | Refills: 3 | Status: SHIPPED | OUTPATIENT
Start: 2025-04-25

## 2025-04-25 RX ORDER — ATORVASTATIN CALCIUM 40 MG/1
40 TABLET, FILM COATED ORAL DAILY
Qty: 90 TABLET | Refills: 3 | Status: SHIPPED | OUTPATIENT
Start: 2025-04-25

## 2025-04-25 RX ORDER — ASPIRIN 81 MG/1
81 TABLET ORAL DAILY
Qty: 90 TABLET | Refills: 3 | Status: SHIPPED | OUTPATIENT
Start: 2025-04-25

## 2025-04-25 RX ORDER — LOSARTAN POTASSIUM 25 MG/1
25 TABLET ORAL DAILY
Qty: 90 TABLET | Refills: 3 | Status: SHIPPED | OUTPATIENT
Start: 2025-04-25

## 2025-04-25 RX ORDER — AMLODIPINE BESYLATE 10 MG/1
10 TABLET ORAL DAILY
Qty: 90 TABLET | Refills: 3 | Status: SHIPPED | OUTPATIENT
Start: 2025-04-25

## 2025-04-25 RX ORDER — CHOLECALCIFEROL (VITAMIN D3) 25 MCG
50 TABLET ORAL DAILY
Qty: 90 TABLET | Refills: 3 | Status: SHIPPED | OUTPATIENT
Start: 2025-04-25

## 2025-04-25 RX ORDER — METOPROLOL SUCCINATE 100 MG/1
100 TABLET, EXTENDED RELEASE ORAL DAILY
Qty: 90 TABLET | Refills: 1 | Status: SHIPPED | OUTPATIENT
Start: 2025-04-25

## 2025-04-25 RX ORDER — ALLOPURINOL 100 MG/1
100 TABLET ORAL DAILY
Qty: 90 TABLET | Refills: 3 | Status: SHIPPED | OUTPATIENT
Start: 2025-04-25

## 2025-04-25 ASSESSMENT — PAIN SCALES - GENERAL: PAINLEVEL_OUTOF10: 0-NO PAIN

## 2025-04-25 NOTE — PROGRESS NOTES
Subjective   Patient ID: Karri Ryan is a 69 y.o. male who presents for Follow-up and Med Refill.    HPI  #tobacco/nicotine abuse  - currently vaping  - quit smoking 2-3 mos ago  - not sure exactly how much he is vaping, but wants to quit that too if it's bad for him  - did not like nicotine patches or gum, and did not help   - does not want to try chantix due to possible nightmares, thinks he may have had seizures in his childhood    #seizure hx  - thinks had a seizure two weeks ago while sleeping  - he bit his tongue and woke up as as a result of the pain  - went back to bed, but woke up with blood around his face  - sleeps alone, unsure if it was an actual seizure  - thinks he may have had seizures in his childhood, was treated with a procedure with something inserted down his throat   - blood sugars 130s-140s  - denied urinary or bowel incontinence    #Hypertension  - meds: Amlodipine 10 mg daily, hydrochlorothiazide 25 mg daily, losartan 25 mg daily, and metoprolol  mg daily  - missing doses:  Denied  - taking BP at home:  No  - denies HA, chest pains, SOB, LE edema, vision changes    Problem List[1]   Current Outpatient Medications   Medication Instructions    alcohol swabs (Alcohol Pads) pads, medicated Use to check blood glucose twice a day    allopurinol (ZYLOPRIM) 100 mg, oral, Daily, TAKE PER DIRECTED    amLODIPine (NORVASC) 10 mg, oral, Daily    ammonium lactate (Lac-Hydrin) 12 % lotion Apply topically.    aspirin 81 mg, oral, Daily    atorvastatin (LIPITOR) 40 mg, oral, Daily    cholecalciferol (VITAMIN D-3) 2,000 Units, oral, Daily    diclofenac sodium (Voltaren) 1 % gel gel     glucose 4 gram chewable tablet As needed    hydroCHLOROthiazide (HYDRODIURIL) 25 mg, oral, Daily    ketoconazole (NIZOral) 2 % cream     losartan (COZAAR) 25 mg, oral, Daily    metFORMIN (GLUCOPHAGE) 1,000 mg, oral, Daily with breakfast    metoprolol succinate XL (TOPROL-XL) 100 mg, oral, Daily    nicotine (Nicoderm  "CQ) 7 mg/24 hr patch 1 patch, transdermal, Every 24 hours    True Metrix Glucose Test Strip strip Use to check blood glucose twice a day    TRUEplus Lancets 33 gauge misc Use to check blood glucose twice a day    Trulicity 4.5 mg, subcutaneous, Once Weekly      Surgical History[2]   Allergies[3]   Social History[4]   Family History[5]     Review of Systems  Ten point review of systems negative unless specified in HPI.     Objective   Vitals: /80 (BP Location: Left arm, Patient Position: Sitting, BP Cuff Size: Adult)   Pulse 90   Temp 36.6 °C (97.8 °F) (Temporal)   Resp 18   Ht 1.753 m (5' 9\")   Wt 97.1 kg (214 lb)   SpO2 98%   BMI 31.60 kg/m²      Physical Exam  General:  Well developed. Alert. No acute distress.  Skin:  Warm, dry. normal skin turgor. no rashes. no lesions.   Head: NCAT  EENT: MMM  Cardiovascular:  Regular rate and rhythm, normal S1 and S2, no gallops, no murmurs and no pericardial rub.  Pulmonary:  CTAB in all fields  Abdomen:  (+) BS. soft. non-tender. non-distended. no abdominal masses. no guarding or rigidity.  Neurologic:  grossly intact  Musculoskeletal: moves all extremities spontaneously  Psychiatric:  appropriate mood and affect    Assessment/Plan   69-year-old male presenting for follow-up.  Patient motivated to quit smoking permanently and nicotine in general.  I discussed Chantix, but he was concerned with the potential for vivid nightmares.  He did also discuss bupropion but he thought he may have had a history of seizures, however based on his description this does not appear to be the case, and have very little suspicion or concern for this.  Regardless, we will trial nicotine nasal sprays initially as he did not do well with patches or gum and does have concern for medication management in general.      Gout, unspecified cause, unspecified chronicity, unspecified site  -     Uric acid; Future  -     allopurinol (Zyloprim) 100 mg tablet; Take 1 tablet (100 mg) by mouth " once daily.    Type 2 diabetes mellitus without complication, unspecified whether long term insulin use  -     Albumin-Creatinine Ratio, Urine Random; Future  Lab Results   Component Value Date    HGBA1C 6.6 (A) 04/14/2025   -     Follows with endocrinology, continue with current regimen    Hypertension, unspecified type  -     amLODIPine (Norvasc) 10 mg tablet; Take 1 tablet (10 mg) by mouth once daily.  -     hydroCHLOROthiazide (HYDRODiuril) 25 mg tablet; Take 1 tablet (25 mg) by mouth once daily.  -     losartan (Cozaar) 25 mg tablet; Take 1 tablet (25 mg) by mouth once daily.  -     metoprolol succinate XL (Toprol-XL) 100 mg 24 hr tablet; Take 1 tablet (100 mg) by mouth once daily.    Stage 3 chronic kidney disease, unspecified whether stage 3a or 3b CKD (Multi)  -     Comprehensive metabolic panel; Future  -     Albumin-Creatinine Ratio, Urine Random; Future  -     Magnesium; Future  -     Phosphorus; Future    Cigarette nicotine dependence without complication  -     CT lung screening low dose; Future  -     nicotine 10 mg/mL spray,non-aerosol; Administer 2 sprays into affected nostril(s) every 1 hour if needed (nicotine cravings). Can increase to up to ten sprays per hour.    Hyperlipidemia, unspecified hyperlipidemia type  Coronary artery disease involving native heart without angina pectoris, unspecified vessel or lesion type  -     aspirin 81 mg EC tablet; Take 1 tablet (81 mg) by mouth once daily.  -     atorvastatin (Lipitor) 40 mg tablet; Take 1 tablet (40 mg) by mouth once daily.    Vitamin D deficiency  -     cholecalciferol (Vitamin D-3) 25 mcg (1,000 units) tablet; Take 2 tablets (50 mcg) by mouth once daily.      RTC in 6 weeks, or earlier as needed.  Attending Supervision: discussed with attending physician Dr. Kathrin Back.   Jessica Colvin MD  Family Medicine Resident.           [1]   Patient Active Problem List  Diagnosis    CAD (coronary artery disease)    Acute bacterial bronchitis    EPI  (acute kidney injury)    Arthritis of greater toe at metatarsophalangeal joint    CAD S/P percutaneous coronary angioplasty    Constipation    Snoring    Dyslipidemia    GERD (gastroesophageal reflux disease)    Gout    Hematuria    History of anemia    HLD (hyperlipidemia)    HTN (hypertension)    Hyperkalemia    Hypermetropia of both eyes    Hypersomnia    Toe joint pain, left    Myalgia    Myopia of both eyes with regular astigmatism    Nicotine dependence    Nocturnal hypoglycemia    NSTEMI, initial episode of care (Multi)    Obesity, Class II, BMI 35-39.9    Other microscopic hematuria    Peripheral scars of retina    Post-tussive syncope    Presbyopia    Sleep apnea    Stage 3 chronic kidney disease (Multi)    Tinea pedis of both feet    Type 2 diabetes mellitus    Viral URI with cough    Vitamin D deficiency   [2]   Past Surgical History:  Procedure Laterality Date    TONSILLECTOMY  11/13/2017    Tonsillectomy   [3]   Allergies  Allergen Reactions    Lisinopril Cough   [4]   Social History  Tobacco Use    Smoking status: Every Day     Types: Cigarettes     Passive exposure: Never    Smokeless tobacco: Never    Tobacco comments:     vape   Vaping Use    Vaping status: Never Used   Substance Use Topics    Alcohol use: Not Currently    Drug use: Never   [5]   Family History  Problem Relation Name Age of Onset    No Known Problems Mother      No Known Problems Father

## 2025-07-19 DIAGNOSIS — I10 HYPERTENSION, UNSPECIFIED TYPE: ICD-10-CM

## 2025-07-24 RX ORDER — METOPROLOL SUCCINATE 100 MG/1
100 TABLET, EXTENDED RELEASE ORAL DAILY
Qty: 90 TABLET | Refills: 1 | Status: SHIPPED | OUTPATIENT
Start: 2025-07-24

## 2025-08-19 ENCOUNTER — APPOINTMENT (OUTPATIENT)
Age: 70
End: 2025-08-19
Payer: COMMERCIAL

## 2025-08-19 VITALS
SYSTOLIC BLOOD PRESSURE: 118 MMHG | HEIGHT: 69 IN | HEART RATE: 101 BPM | DIASTOLIC BLOOD PRESSURE: 70 MMHG | BODY MASS INDEX: 30.56 KG/M2 | WEIGHT: 206.35 LBS

## 2025-08-19 DIAGNOSIS — F17.200 TOBACCO DEPENDENCE: Primary | ICD-10-CM

## 2025-08-19 DIAGNOSIS — E11.9 TYPE 2 DIABETES MELLITUS WITHOUT COMPLICATION, WITHOUT LONG-TERM CURRENT USE OF INSULIN: ICD-10-CM

## 2025-08-19 LAB — POC HEMOGLOBIN A1C: 6.6 % (ref 4.2–6.5)

## 2025-08-19 PROCEDURE — 3078F DIAST BP <80 MM HG: CPT | Performed by: STUDENT IN AN ORGANIZED HEALTH CARE EDUCATION/TRAINING PROGRAM

## 2025-08-19 PROCEDURE — 99214 OFFICE O/P EST MOD 30 MIN: CPT | Performed by: STUDENT IN AN ORGANIZED HEALTH CARE EDUCATION/TRAINING PROGRAM

## 2025-08-19 PROCEDURE — 3008F BODY MASS INDEX DOCD: CPT | Performed by: STUDENT IN AN ORGANIZED HEALTH CARE EDUCATION/TRAINING PROGRAM

## 2025-08-19 PROCEDURE — 3074F SYST BP LT 130 MM HG: CPT | Performed by: STUDENT IN AN ORGANIZED HEALTH CARE EDUCATION/TRAINING PROGRAM

## 2025-08-19 PROCEDURE — 1159F MED LIST DOCD IN RCRD: CPT | Performed by: STUDENT IN AN ORGANIZED HEALTH CARE EDUCATION/TRAINING PROGRAM

## 2025-08-19 PROCEDURE — 83036 HEMOGLOBIN GLYCOSYLATED A1C: CPT | Mod: QW | Performed by: STUDENT IN AN ORGANIZED HEALTH CARE EDUCATION/TRAINING PROGRAM

## 2025-08-19 PROCEDURE — 3044F HG A1C LEVEL LT 7.0%: CPT | Performed by: STUDENT IN AN ORGANIZED HEALTH CARE EDUCATION/TRAINING PROGRAM

## 2025-08-19 PROCEDURE — 4010F ACE/ARB THERAPY RXD/TAKEN: CPT | Performed by: STUDENT IN AN ORGANIZED HEALTH CARE EDUCATION/TRAINING PROGRAM

## 2025-08-19 RX ORDER — DULAGLUTIDE 4.5 MG/.5ML
4.5 INJECTION, SOLUTION SUBCUTANEOUS
Qty: 6 ML | Refills: 3 | Status: SHIPPED | OUTPATIENT
Start: 2025-08-19

## 2025-08-19 RX ORDER — METFORMIN HYDROCHLORIDE 1000 MG/1
1000 TABLET ORAL
Qty: 90 TABLET | Refills: 3 | Status: SHIPPED | OUTPATIENT
Start: 2025-08-19

## 2025-08-19 RX ORDER — CALCIUM CITRATE/VITAMIN D3 200MG-6.25
TABLET ORAL
Qty: 200 STRIP | Refills: 3 | Status: SHIPPED | OUTPATIENT
Start: 2025-08-19

## 2025-08-19 ASSESSMENT — PATIENT HEALTH QUESTIONNAIRE - PHQ9
2. FEELING DOWN, DEPRESSED OR HOPELESS: NOT AT ALL
1. LITTLE INTEREST OR PLEASURE IN DOING THINGS: NOT AT ALL
SUM OF ALL RESPONSES TO PHQ9 QUESTIONS 1 AND 2: 0

## 2025-08-19 ASSESSMENT — ENCOUNTER SYMPTOMS
OCCASIONAL FEELINGS OF UNSTEADINESS: 0
LOSS OF SENSATION IN FEET: 0
DEPRESSION: 0

## 2025-11-04 ENCOUNTER — APPOINTMENT (OUTPATIENT)
Dept: OPHTHALMOLOGY | Facility: CLINIC | Age: 70
End: 2025-11-04
Payer: COMMERCIAL